# Patient Record
Sex: FEMALE | Race: WHITE | Employment: OTHER | URBAN - NONMETROPOLITAN AREA
[De-identification: names, ages, dates, MRNs, and addresses within clinical notes are randomized per-mention and may not be internally consistent; named-entity substitution may affect disease eponyms.]

---

## 2017-01-03 ENCOUNTER — TELEPHONE (OUTPATIENT)
Dept: GASTROENTEROLOGY | Age: 62
End: 2017-01-03

## 2017-01-05 ENCOUNTER — HOSPITAL ENCOUNTER (OUTPATIENT)
Dept: WOMENS IMAGING | Age: 62
Discharge: HOME | End: 2017-01-05
Payer: COMMERCIAL

## 2017-01-05 DIAGNOSIS — Z12.31 ENCOUNTER FOR SCREENING MAMMOGRAM FOR MALIGNANT NEOPLASM OF BREAST: ICD-10-CM

## 2017-01-05 PROCEDURE — 77063 BREAST TOMOSYNTHESIS BI: CPT

## 2017-01-19 ENCOUNTER — OFFICE VISIT (OUTPATIENT)
Dept: OTOLARYNGOLOGY | Age: 62
End: 2017-01-19
Payer: COMMERCIAL

## 2017-01-19 VITALS
WEIGHT: 141.8 LBS | HEIGHT: 63 IN | DIASTOLIC BLOOD PRESSURE: 88 MMHG | OXYGEN SATURATION: 97 % | BODY MASS INDEX: 25.12 KG/M2 | SYSTOLIC BLOOD PRESSURE: 138 MMHG | HEART RATE: 80 BPM

## 2017-01-19 DIAGNOSIS — H91.93 DECREASED HEARING OF BOTH EARS: ICD-10-CM

## 2017-01-19 DIAGNOSIS — H93.13 TINNITUS OF BOTH EARS: Primary | ICD-10-CM

## 2017-01-19 DIAGNOSIS — H61.21 IMPACTED CERUMEN OF RIGHT EAR: ICD-10-CM

## 2017-01-19 PROCEDURE — 99202 OFFICE O/P NEW SF 15 MIN: CPT | Performed by: OTOLARYNGOLOGY

## 2017-01-19 PROCEDURE — 69210 REMOVE IMPACTED EAR WAX UNI: CPT | Performed by: OTOLARYNGOLOGY

## 2017-01-19 RX ORDER — DIAZEPAM 5 MG/1
5 TABLET ORAL EVERY 6 HOURS PRN
COMMUNITY
End: 2017-03-16 | Stop reason: CLARIF

## 2017-01-19 ASSESSMENT — ENCOUNTER SYMPTOMS
GASTROINTESTINAL NEGATIVE: 1
BACK PAIN: 1
EYES NEGATIVE: 1
ALLERGIC/IMMUNOLOGIC NEGATIVE: 1
RESPIRATORY NEGATIVE: 1

## 2017-01-25 ENCOUNTER — TELEPHONE (OUTPATIENT)
Dept: OTOLARYNGOLOGY | Age: 62
End: 2017-01-25

## 2017-02-10 ENCOUNTER — TELEPHONE (OUTPATIENT)
Dept: OTOLARYNGOLOGY | Age: 62
End: 2017-02-10

## 2017-02-13 ENCOUNTER — HOSPITAL ENCOUNTER (OUTPATIENT)
Dept: VASCULAR LAB | Age: 62
Discharge: HOME | End: 2017-02-13
Payer: COMMERCIAL

## 2017-02-13 PROCEDURE — 93971 EXTREMITY STUDY: CPT

## 2017-02-20 ENCOUNTER — TELEPHONE (OUTPATIENT)
Dept: VASCULAR SURGERY | Age: 62
End: 2017-02-20

## 2017-02-21 ENCOUNTER — TELEPHONE (OUTPATIENT)
Dept: OTOLARYNGOLOGY | Age: 62
End: 2017-02-21

## 2017-03-16 ENCOUNTER — OFFICE VISIT (OUTPATIENT)
Dept: VASCULAR SURGERY | Age: 62
End: 2017-03-16
Payer: COMMERCIAL

## 2017-03-16 DIAGNOSIS — M51.36 DDD (DEGENERATIVE DISC DISEASE), LUMBAR: ICD-10-CM

## 2017-03-16 DIAGNOSIS — C80.1 CANCER (HCC): ICD-10-CM

## 2017-03-16 DIAGNOSIS — D68.51 FACTOR V LEIDEN (HCC): ICD-10-CM

## 2017-03-16 DIAGNOSIS — G50.0 TRIGEMINAL NEURALGIA: ICD-10-CM

## 2017-03-16 PROCEDURE — 99213 OFFICE O/P EST LOW 20 MIN: CPT | Performed by: NURSE PRACTITIONER

## 2017-03-16 RX ORDER — MULTIVIT-MIN/IRON/FOLIC/HRB186 3.3 MG-25
TABLET ORAL
COMMUNITY
End: 2022-04-14

## 2017-03-27 ENCOUNTER — EMPLOYEE WELLNESS (OUTPATIENT)
Dept: OTHER | Age: 62
End: 2017-03-27

## 2017-03-27 ENCOUNTER — OFFICE VISIT (OUTPATIENT)
Dept: OTOLARYNGOLOGY | Age: 62
End: 2017-03-27
Payer: COMMERCIAL

## 2017-03-27 VITALS
SYSTOLIC BLOOD PRESSURE: 138 MMHG | OXYGEN SATURATION: 98 % | DIASTOLIC BLOOD PRESSURE: 70 MMHG | HEIGHT: 63 IN | BODY MASS INDEX: 24.8 KG/M2 | WEIGHT: 140 LBS | RESPIRATION RATE: 20 BRPM | HEART RATE: 86 BPM

## 2017-03-27 DIAGNOSIS — H93.13 TINNITUS, BILATERAL: Primary | ICD-10-CM

## 2017-03-27 LAB
CHOLESTEROL, TOTAL: 235 MG/DL (ref 160–199)
GLUCOSE BLD-MCNC: 102 MG/DL (ref 74–109)
HDLC SERPL-MCNC: 83 MG/DL (ref 65–121)
LDL CHOLESTEROL CALCULATED: 138 MG/DL
TRIGL SERPL-MCNC: 71 MG/DL (ref 150–199)

## 2017-03-27 PROCEDURE — 99214 OFFICE O/P EST MOD 30 MIN: CPT | Performed by: OTOLARYNGOLOGY

## 2017-12-14 LAB
INR BLD: 1.58 (ref 0.88–1.18)
PROTHROMBIN TIME: 18.9 SEC (ref 12–14.6)

## 2017-12-21 LAB
INR BLD: 1.56 (ref 0.88–1.18)
PROTHROMBIN TIME: 18.7 SEC (ref 12–14.6)

## 2017-12-28 LAB
INR BLD: 1.72 (ref 0.88–1.18)
PROTHROMBIN TIME: 20.2 SEC (ref 12–14.6)

## 2018-01-02 LAB
INR BLD: 2.72 (ref 0.88–1.18)
PROTHROMBIN TIME: 29.2 SEC (ref 12–14.6)

## 2018-01-10 ENCOUNTER — HOSPITAL ENCOUNTER (OUTPATIENT)
Dept: WOMENS IMAGING | Age: 63
Discharge: HOME | End: 2018-01-10
Payer: COMMERCIAL

## 2018-01-10 DIAGNOSIS — Z12.39 BREAST CANCER SCREENING: ICD-10-CM

## 2018-01-10 LAB
INR BLD: 2.13 (ref 0.88–1.18)
PROTHROMBIN TIME: 24 SEC (ref 12–14.6)

## 2018-01-10 PROCEDURE — 77063 BREAST TOMOSYNTHESIS BI: CPT

## 2018-01-17 LAB
INR BLD: 2.46 (ref 0.88–1.18)
PROTHROMBIN TIME: 26.9 SEC (ref 12–14.6)

## 2018-03-20 VITALS — WEIGHT: 139 LBS | BODY MASS INDEX: 24.62 KG/M2

## 2018-03-28 ENCOUNTER — EMPLOYEE WELLNESS (OUTPATIENT)
Dept: OTHER | Age: 63
End: 2018-03-28

## 2018-03-28 LAB
CHOLESTEROL, TOTAL: 233 MG/DL (ref 160–199)
GLUCOSE BLD-MCNC: 92 MG/DL (ref 74–109)
HDLC SERPL-MCNC: 83 MG/DL (ref 65–121)
LDL CHOLESTEROL CALCULATED: 134 MG/DL
TRIGL SERPL-MCNC: 82 MG/DL (ref 0–149)

## 2018-04-02 VITALS — BODY MASS INDEX: 24.62 KG/M2 | WEIGHT: 139 LBS

## 2018-05-24 LAB
INR BLD: 2.29 (ref 0.88–1.18)
PROTHROMBIN TIME: 25.3 SEC (ref 12–14.6)

## 2018-06-15 LAB
INR BLD: 2.16 (ref 0.88–1.18)
PROTHROMBIN TIME: 24.1 SEC (ref 12–14.6)

## 2018-07-05 LAB
ALBUMIN SERPL-MCNC: 4.3 G/DL (ref 3.5–5.2)
ALP BLD-CCNC: 53 U/L (ref 35–104)
ALT SERPL-CCNC: 11 U/L (ref 5–33)
ANION GAP SERPL CALCULATED.3IONS-SCNC: 14 MMOL/L (ref 7–19)
AST SERPL-CCNC: 18 U/L (ref 5–32)
BASOPHILS ABSOLUTE: 0 K/UL (ref 0–0.2)
BASOPHILS RELATIVE PERCENT: 0.7 % (ref 0–1)
BILIRUB SERPL-MCNC: 0.3 MG/DL (ref 0.2–1.2)
BUN BLDV-MCNC: 25 MG/DL (ref 8–23)
CALCIUM SERPL-MCNC: 9.1 MG/DL (ref 8.8–10.2)
CHLORIDE BLD-SCNC: 102 MMOL/L (ref 98–111)
CHOLESTEROL, TOTAL: 222 MG/DL (ref 160–199)
CO2: 25 MMOL/L (ref 22–29)
CREAT SERPL-MCNC: 0.7 MG/DL (ref 0.5–0.9)
EOSINOPHILS ABSOLUTE: 0.1 K/UL (ref 0–0.6)
EOSINOPHILS RELATIVE PERCENT: 1.5 % (ref 0–5)
GFR NON-AFRICAN AMERICAN: >60
GLUCOSE BLD-MCNC: 88 MG/DL (ref 74–109)
HCT VFR BLD CALC: 40.5 % (ref 37–47)
HDLC SERPL-MCNC: 75 MG/DL (ref 65–121)
HEMOGLOBIN: 12.6 G/DL (ref 12–16)
LDL CHOLESTEROL CALCULATED: 133 MG/DL
LYMPHOCYTES ABSOLUTE: 1.7 K/UL (ref 1.1–4.5)
LYMPHOCYTES RELATIVE PERCENT: 41 % (ref 20–40)
MCH RBC QN AUTO: 28.6 PG (ref 27–31)
MCHC RBC AUTO-ENTMCNC: 31.1 G/DL (ref 33–37)
MCV RBC AUTO: 92 FL (ref 81–99)
MONOCYTES ABSOLUTE: 0.4 K/UL (ref 0–0.9)
MONOCYTES RELATIVE PERCENT: 9.1 % (ref 0–10)
NEUTROPHILS ABSOLUTE: 1.9 K/UL (ref 1.5–7.5)
NEUTROPHILS RELATIVE PERCENT: 47.5 % (ref 50–65)
PDW BLD-RTO: 13.5 % (ref 11.5–14.5)
PLATELET # BLD: 222 K/UL (ref 130–400)
PMV BLD AUTO: 10.8 FL (ref 9.4–12.3)
POTASSIUM SERPL-SCNC: 4.5 MMOL/L (ref 3.5–5)
RBC # BLD: 4.4 M/UL (ref 4.2–5.4)
SODIUM BLD-SCNC: 141 MMOL/L (ref 136–145)
T3 FREE: 2.8 PG/ML (ref 2–4.4)
T4 FREE: 1.3 NG/DL (ref 0.9–1.7)
TOTAL PROTEIN: 7 G/DL (ref 6.6–8.7)
TRIGL SERPL-MCNC: 69 MG/DL (ref 0–149)
TSH SERPL DL<=0.05 MIU/L-ACNC: 1.67 UIU/ML (ref 0.27–4.2)
URIC ACID, SERUM: 4.3 MG/DL (ref 2.4–5.7)
WBC # BLD: 4.1 K/UL (ref 4.8–10.8)

## 2018-07-19 LAB
INR BLD: 2.34 (ref 0.88–1.18)
PROTHROMBIN TIME: 25.7 SEC (ref 12–14.6)

## 2018-09-06 LAB
INR BLD: 2.34 (ref 0.88–1.18)
PROTHROMBIN TIME: 25.7 SEC (ref 12–14.6)

## 2018-10-09 LAB
INR BLD: 2.91 (ref 0.88–1.18)
PROTHROMBIN TIME: 30.6 SEC (ref 12–14.6)

## 2018-11-19 LAB
INR BLD: 2.58 (ref 0.88–1.18)
PROTHROMBIN TIME: 27.8 SEC (ref 12–14.6)

## 2018-12-18 LAB
INR BLD: 2.47 (ref 0.88–1.18)
PROTHROMBIN TIME: 26 SEC (ref 12–14.6)

## 2019-01-23 LAB
INR BLD: 2.43 (ref 0.88–1.18)
PROTHROMBIN TIME: 25.6 SEC (ref 12–14.6)

## 2019-02-18 LAB
INR BLD: 2.66 (ref 0.88–1.18)
PROTHROMBIN TIME: 27.6 SEC (ref 12–14.6)

## 2019-03-12 ENCOUNTER — HOSPITAL ENCOUNTER (OUTPATIENT)
Dept: WOMENS IMAGING | Age: 64
Discharge: HOME | End: 2019-03-12
Payer: COMMERCIAL

## 2019-03-12 DIAGNOSIS — Z78.0 ASYMPTOMATIC AGE-RELATED POSTMENOPAUSAL STATE: ICD-10-CM

## 2019-03-12 DIAGNOSIS — Z12.31 ENCOUNTER FOR SCREENING MAMMOGRAM FOR MALIGNANT NEOPLASM OF BREAST: ICD-10-CM

## 2019-03-12 PROCEDURE — 77080 DXA BONE DENSITY AXIAL: CPT

## 2019-03-12 PROCEDURE — 77067 SCR MAMMO BI INCL CAD: CPT

## 2019-03-22 LAB
INR BLD: 2.12 (ref 0.88–1.18)
PROTHROMBIN TIME: 23 SEC (ref 12–14.6)

## 2019-04-15 LAB
INR BLD: 2.17 (ref 0.88–1.18)
PROTHROMBIN TIME: 23.4 SEC (ref 12–14.6)

## 2019-05-20 LAB
INR BLD: 2.83 (ref 0.88–1.18)
PROTHROMBIN TIME: 29 SEC (ref 12–14.6)

## 2019-06-26 LAB
INR BLD: 1.67 (ref 0.88–1.18)
PROTHROMBIN TIME: 19 SEC (ref 12–14.6)

## 2019-07-05 LAB
ALBUMIN SERPL-MCNC: 4.2 G/DL (ref 3.5–5.2)
ALP BLD-CCNC: 64 U/L (ref 35–104)
ALT SERPL-CCNC: 13 U/L (ref 5–33)
ANION GAP SERPL CALCULATED.3IONS-SCNC: 13 MMOL/L (ref 7–19)
AST SERPL-CCNC: 17 U/L (ref 5–32)
BASOPHILS ABSOLUTE: 0 K/UL (ref 0–0.2)
BASOPHILS RELATIVE PERCENT: 0.7 % (ref 0–1)
BILIRUB SERPL-MCNC: 0.3 MG/DL (ref 0.2–1.2)
BUN BLDV-MCNC: 19 MG/DL (ref 8–23)
CALCIUM SERPL-MCNC: 8.9 MG/DL (ref 8.8–10.2)
CHLORIDE BLD-SCNC: 104 MMOL/L (ref 98–111)
CHOLESTEROL, TOTAL: 203 MG/DL (ref 160–199)
CO2: 24 MMOL/L (ref 22–29)
CREAT SERPL-MCNC: 0.6 MG/DL (ref 0.5–0.9)
EOSINOPHILS ABSOLUTE: 0.1 K/UL (ref 0–0.6)
EOSINOPHILS RELATIVE PERCENT: 2.2 % (ref 0–5)
GFR NON-AFRICAN AMERICAN: >60
GLUCOSE BLD-MCNC: 95 MG/DL (ref 74–109)
HCT VFR BLD CALC: 39.2 % (ref 37–47)
HDLC SERPL-MCNC: 75 MG/DL (ref 65–121)
HEMOGLOBIN: 12.2 G/DL (ref 12–16)
INR BLD: 2.33 (ref 0.88–1.18)
LDL CHOLESTEROL CALCULATED: 118 MG/DL
LYMPHOCYTES ABSOLUTE: 1.5 K/UL (ref 1.1–4.5)
LYMPHOCYTES RELATIVE PERCENT: 37.5 % (ref 20–40)
MCH RBC QN AUTO: 28.4 PG (ref 27–31)
MCHC RBC AUTO-ENTMCNC: 31.1 G/DL (ref 33–37)
MCV RBC AUTO: 91.2 FL (ref 81–99)
MONOCYTES ABSOLUTE: 0.4 K/UL (ref 0–0.9)
MONOCYTES RELATIVE PERCENT: 9.4 % (ref 0–10)
NEUTROPHILS ABSOLUTE: 2 K/UL (ref 1.5–7.5)
NEUTROPHILS RELATIVE PERCENT: 50.2 % (ref 50–65)
PDW BLD-RTO: 13.7 % (ref 11.5–14.5)
PLATELET # BLD: 230 K/UL (ref 130–400)
PMV BLD AUTO: 10.5 FL (ref 9.4–12.3)
POTASSIUM SERPL-SCNC: 4.4 MMOL/L (ref 3.5–5)
PROTHROMBIN TIME: 24.8 SEC (ref 12–14.6)
RBC # BLD: 4.3 M/UL (ref 4.2–5.4)
SODIUM BLD-SCNC: 141 MMOL/L (ref 136–145)
T3 FREE: 3.1 PG/ML (ref 2–4.4)
T4 FREE: 1.3 NG/DL (ref 0.9–1.7)
TOTAL PROTEIN: 6.8 G/DL (ref 6.6–8.7)
TRIGL SERPL-MCNC: 52 MG/DL (ref 0–149)
TSH SERPL DL<=0.05 MIU/L-ACNC: 1.5 UIU/ML (ref 0.27–4.2)
URIC ACID, SERUM: 4.2 MG/DL (ref 2.4–5.7)
WBC # BLD: 4.1 K/UL (ref 4.8–10.8)

## 2019-08-07 LAB
INR BLD: 3.02 (ref 0.88–1.18)
PROTHROMBIN TIME: 30.5 SEC (ref 12–14.6)

## 2019-08-22 LAB
INR BLD: 2.28 (ref 0.88–1.18)
PROTHROMBIN TIME: 24.4 SEC (ref 12–14.6)

## 2019-10-21 LAB
INR BLD: 2.34 (ref 0.88–1.18)
PROTHROMBIN TIME: 24.9 SEC (ref 12–14.6)

## 2019-12-31 LAB
INR BLD: 2.63 (ref 0.88–1.18)
PROTHROMBIN TIME: 27.3 SEC (ref 12–14.6)

## 2020-02-06 LAB
INR BLD: 2.63 (ref 0.88–1.18)
PROTHROMBIN TIME: 27.3 SEC (ref 12–14.6)

## 2020-03-12 LAB
INR BLD: 2.18 (ref 0.88–1.18)
PROTHROMBIN TIME: 24.7 SEC (ref 12–14.6)

## 2020-03-16 ENCOUNTER — HOSPITAL ENCOUNTER (OUTPATIENT)
Dept: WOMENS IMAGING | Age: 65
Discharge: HOME | End: 2020-03-16
Payer: COMMERCIAL

## 2020-03-16 PROCEDURE — 77067 SCR MAMMO BI INCL CAD: CPT

## 2020-04-28 LAB
INR BLD: 2.45 (ref 0.88–1.18)
PROTHROMBIN TIME: 27.1 SEC (ref 12–14.6)

## 2020-06-17 LAB
INR BLD: 2.62 (ref 0.88–1.18)
PROTHROMBIN TIME: 28.7 SEC (ref 12–14.6)

## 2020-07-20 LAB
ALBUMIN SERPL-MCNC: 4.6 G/DL (ref 3.5–5.2)
ALP BLD-CCNC: 61 U/L (ref 35–104)
ALT SERPL-CCNC: 12 U/L (ref 5–33)
ANION GAP SERPL CALCULATED.3IONS-SCNC: 10 MMOL/L (ref 7–19)
AST SERPL-CCNC: 16 U/L (ref 5–32)
BASOPHILS ABSOLUTE: 0 K/UL (ref 0–0.2)
BASOPHILS RELATIVE PERCENT: 0.6 % (ref 0–1)
BILIRUB SERPL-MCNC: 0.4 MG/DL (ref 0.2–1.2)
BUN BLDV-MCNC: 25 MG/DL (ref 8–23)
CALCIUM SERPL-MCNC: 9.5 MG/DL (ref 8.8–10.2)
CHLORIDE BLD-SCNC: 102 MMOL/L (ref 98–111)
CHOLESTEROL, TOTAL: 213 MG/DL (ref 160–199)
CO2: 27 MMOL/L (ref 22–29)
CREAT SERPL-MCNC: 0.7 MG/DL (ref 0.5–0.9)
EOSINOPHILS ABSOLUTE: 0.1 K/UL (ref 0–0.6)
EOSINOPHILS RELATIVE PERCENT: 1.3 % (ref 0–5)
GFR AFRICAN AMERICAN: >59
GFR NON-AFRICAN AMERICAN: >60
GLUCOSE BLD-MCNC: 92 MG/DL (ref 74–109)
HCT VFR BLD CALC: 41.2 % (ref 37–47)
HDLC SERPL-MCNC: 78 MG/DL (ref 65–121)
HEMOGLOBIN: 13.1 G/DL (ref 12–16)
IMMATURE GRANULOCYTES #: 0 K/UL
INR BLD: 2.58 (ref 0.88–1.18)
LDL CHOLESTEROL CALCULATED: 122 MG/DL
LYMPHOCYTES ABSOLUTE: 1.5 K/UL (ref 1.1–4.5)
LYMPHOCYTES RELATIVE PERCENT: 32.7 % (ref 20–40)
MCH RBC QN AUTO: 28.9 PG (ref 27–31)
MCHC RBC AUTO-ENTMCNC: 31.8 G/DL (ref 33–37)
MCV RBC AUTO: 90.9 FL (ref 81–99)
MONOCYTES ABSOLUTE: 0.3 K/UL (ref 0–0.9)
MONOCYTES RELATIVE PERCENT: 6.2 % (ref 0–10)
NEUTROPHILS ABSOLUTE: 2.7 K/UL (ref 1.5–7.5)
NEUTROPHILS RELATIVE PERCENT: 59 % (ref 50–65)
PDW BLD-RTO: 14 % (ref 11.5–14.5)
PLATELET # BLD: 248 K/UL (ref 130–400)
PMV BLD AUTO: 10.4 FL (ref 9.4–12.3)
POTASSIUM SERPL-SCNC: 4.3 MMOL/L (ref 3.5–5)
PROTHROMBIN TIME: 28.3 SEC (ref 12–14.6)
RBC # BLD: 4.53 M/UL (ref 4.2–5.4)
SODIUM BLD-SCNC: 139 MMOL/L (ref 136–145)
T3 FREE: 2.7 PG/ML (ref 2–4.4)
T4 FREE: 1.24 NG/DL (ref 0.93–1.7)
TOTAL PROTEIN: 7 G/DL (ref 6.6–8.7)
TRIGL SERPL-MCNC: 65 MG/DL (ref 0–149)
TSH SERPL DL<=0.05 MIU/L-ACNC: 1.48 UIU/ML (ref 0.27–4.2)
URIC ACID, SERUM: 4.4 MG/DL (ref 2.4–5.7)
WBC # BLD: 4.7 K/UL (ref 4.8–10.8)

## 2020-08-05 LAB
INR BLD: 2.66 (ref 0.88–1.18)
PROTHROMBIN TIME: 29 SEC (ref 12–14.6)

## 2020-09-03 ENCOUNTER — EMPLOYEE WELLNESS (OUTPATIENT)
Dept: OTHER | Age: 65
End: 2020-09-03

## 2020-09-03 LAB
CHOLESTEROL, TOTAL: 212 MG/DL (ref 160–199)
GLUCOSE BLD-MCNC: 75 MG/DL (ref 74–109)
HDLC SERPL-MCNC: 72 MG/DL (ref 65–121)
LDL CHOLESTEROL CALCULATED: 125 MG/DL
TRIGL SERPL-MCNC: 76 MG/DL (ref 0–149)

## 2020-09-15 LAB
INR BLD: 2.3 (ref 0.88–1.18)
PROTHROMBIN TIME: 25.8 SEC (ref 12–14.6)

## 2020-10-14 LAB
INR BLD: 2.02 (ref 0.88–1.18)
PROTHROMBIN TIME: 23.2 SEC (ref 12–14.6)

## 2020-10-19 VITALS — WEIGHT: 144 LBS | BODY MASS INDEX: 25.51 KG/M2

## 2020-11-03 PROBLEM — K21.9 GERD (GASTROESOPHAGEAL REFLUX DISEASE): Status: RESOLVED | Noted: 2020-11-03 | Resolved: 2020-11-03

## 2020-11-09 LAB
INR BLD: 2.41 (ref 0.88–1.18)
PROTHROMBIN TIME: 26.7 SEC (ref 12–14.6)

## 2020-12-16 LAB
INR BLD: 1.98 (ref 0.88–1.18)
PROTHROMBIN TIME: 22.8 SEC (ref 12–14.6)

## 2020-12-23 LAB
INR BLD: 1.62 (ref 0.88–1.18)
PROTHROMBIN TIME: 19.4 SEC (ref 12–14.6)

## 2020-12-28 LAB
INR BLD: 2.15 (ref 0.88–1.18)
PROTHROMBIN TIME: 24.4 SEC (ref 12–14.6)

## 2021-01-04 LAB
INR BLD: 2.15 (ref 0.88–1.18)
PROTHROMBIN TIME: 24.4 SEC (ref 12–14.6)

## 2021-01-18 LAB
ALBUMIN SERPL-MCNC: 4.2 G/DL (ref 3.5–5.2)
ALP BLD-CCNC: 70 U/L (ref 35–104)
ALT SERPL-CCNC: 16 U/L (ref 5–33)
ANION GAP SERPL CALCULATED.3IONS-SCNC: 8 MMOL/L (ref 7–19)
AST SERPL-CCNC: 18 U/L (ref 5–32)
BILIRUB SERPL-MCNC: <0.2 MG/DL (ref 0.2–1.2)
BUN BLDV-MCNC: 21 MG/DL (ref 8–23)
CALCIUM SERPL-MCNC: 9.5 MG/DL (ref 8.8–10.2)
CHLORIDE BLD-SCNC: 104 MMOL/L (ref 98–111)
CHOLESTEROL, TOTAL: 209 MG/DL (ref 160–199)
CO2: 27 MMOL/L (ref 22–29)
CREAT SERPL-MCNC: 0.8 MG/DL (ref 0.5–0.9)
GFR AFRICAN AMERICAN: >59
GFR NON-AFRICAN AMERICAN: >60
GLUCOSE BLD-MCNC: 107 MG/DL (ref 74–109)
HDLC SERPL-MCNC: 79 MG/DL (ref 65–121)
INR BLD: 2.89 (ref 0.88–1.18)
LDL CHOLESTEROL CALCULATED: 117 MG/DL
POTASSIUM SERPL-SCNC: 4.4 MMOL/L (ref 3.5–5)
PROTHROMBIN TIME: 31 SEC (ref 12–14.6)
SODIUM BLD-SCNC: 139 MMOL/L (ref 136–145)
TOTAL PROTEIN: 7 G/DL (ref 6.6–8.7)
TRIGL SERPL-MCNC: 67 MG/DL (ref 0–149)
VITAMIN D 25-HYDROXY: 27.5 NG/ML

## 2021-03-01 LAB
INR BLD: 2.25 (ref 0.88–1.18)
PROTHROMBIN TIME: 25.3 SEC (ref 12–14.6)

## 2021-03-18 ENCOUNTER — HOSPITAL ENCOUNTER (OUTPATIENT)
Dept: WOMENS IMAGING | Age: 66
Discharge: HOME | End: 2021-03-18
Payer: COMMERCIAL

## 2021-03-18 DIAGNOSIS — Z12.31 ENCOUNTER FOR SCREENING MAMMOGRAM FOR MALIGNANT NEOPLASM OF BREAST: ICD-10-CM

## 2021-03-18 PROCEDURE — 77067 SCR MAMMO BI INCL CAD: CPT

## 2021-04-05 LAB
INR BLD: 2.29 (ref 0.88–1.18)
PROTHROMBIN TIME: 25.7 SEC (ref 12–14.6)

## 2021-05-03 LAB
INR BLD: 2.42 (ref 0.88–1.18)
PROTHROMBIN TIME: 26.9 SEC (ref 12–14.6)

## 2021-06-07 LAB
INR BLD: 2.13 (ref 0.88–1.18)
PROTHROMBIN TIME: 24.2 SEC (ref 12–14.6)

## 2021-06-17 LAB
CHOLESTEROL, TOTAL: 213 MG/DL (ref 160–199)
GLUCOSE BLD-MCNC: 87 MG/DL (ref 74–109)
HDLC SERPL-MCNC: 79 MG/DL (ref 65–121)
LDL CHOLESTEROL CALCULATED: 121 MG/DL
TRIGL SERPL-MCNC: 65 MG/DL (ref 0–149)

## 2021-07-20 LAB
INR BLD: 2.15 (ref 0.88–1.18)
PROTHROMBIN TIME: 23.8 SEC (ref 12–14.6)

## 2021-08-24 LAB
INR BLD: 2.82 (ref 0.88–1.18)
PROTHROMBIN TIME: 29.2 SEC (ref 12–14.6)

## 2021-09-20 LAB
INR BLD: 3.68 (ref 0.88–1.18)
PROTHROMBIN TIME: 35.7 SEC (ref 12–14.6)

## 2021-09-24 ENCOUNTER — HOSPITAL ENCOUNTER (OUTPATIENT)
Dept: CT IMAGING | Age: 66
Discharge: HOME | End: 2021-09-24
Payer: MEDICARE

## 2021-09-24 DIAGNOSIS — R51.9 INTRACTABLE HEADACHE, UNSPECIFIED CHRONICITY PATTERN, UNSPECIFIED HEADACHE TYPE: ICD-10-CM

## 2021-09-24 DIAGNOSIS — M54.2 CERVICALGIA: ICD-10-CM

## 2021-09-24 DIAGNOSIS — G50.1 ATYPICAL FACE PAIN: ICD-10-CM

## 2021-09-24 LAB
INR BLD: 1.24 (ref 0.88–1.18)
PROTHROMBIN TIME: 15.7 SEC (ref 12–14.6)

## 2021-09-24 PROCEDURE — 72125 CT NECK SPINE W/O DYE: CPT

## 2021-09-24 PROCEDURE — 70486 CT MAXILLOFACIAL W/O DYE: CPT

## 2021-09-24 PROCEDURE — 70450 CT HEAD/BRAIN W/O DYE: CPT

## 2021-09-30 LAB
INR BLD: 2.42 (ref 0.88–1.18)
PROTHROMBIN TIME: 25.9 SEC (ref 12–14.6)

## 2021-11-22 LAB
INR BLD: 3.21 (ref 0.88–1.18)
PROTHROMBIN TIME: 32 SEC (ref 12–14.6)

## 2021-12-20 ENCOUNTER — HOSPITAL ENCOUNTER (OUTPATIENT)
Dept: MRI IMAGING | Age: 66
Discharge: HOME | End: 2021-12-20
Payer: MEDICARE

## 2021-12-20 DIAGNOSIS — M89.8X0 OTHER SPECIFIED DISORDERS OF BONE, MULTIPLE SITES: ICD-10-CM

## 2021-12-20 LAB
INR BLD: 2.6 (ref 0.88–1.18)
PROTHROMBIN TIME: 27.4 SEC (ref 12–14.6)

## 2021-12-20 PROCEDURE — 73221 MRI JOINT UPR EXTREM W/O DYE: CPT

## 2021-12-28 ENCOUNTER — HOSPITAL ENCOUNTER (OUTPATIENT)
Dept: MRI IMAGING | Age: 66
Discharge: HOME | End: 2021-12-28
Payer: MEDICARE

## 2021-12-28 DIAGNOSIS — M89.8X1 PAIN OF LEFT SCAPULA: ICD-10-CM

## 2021-12-28 PROCEDURE — 73221 MRI JOINT UPR EXTREM W/O DYE: CPT

## 2022-01-17 LAB
INR BLD: 2.68 (ref 0.88–1.18)
PROTHROMBIN TIME: 28 SEC (ref 12–14.6)

## 2022-02-09 ENCOUNTER — HOSPITAL ENCOUNTER (OUTPATIENT)
Dept: PHYSICAL THERAPY | Age: 67
Setting detail: THERAPIES SERIES
Discharge: HOME | End: 2022-02-09
Payer: MEDICARE

## 2022-02-09 PROCEDURE — 97162 PT EVAL MOD COMPLEX 30 MIN: CPT

## 2022-02-09 ASSESSMENT — PAIN DESCRIPTION - LOCATION: LOCATION: SHOULDER

## 2022-02-09 ASSESSMENT — PAIN DESCRIPTION - ONSET: ONSET: ON-GOING

## 2022-02-09 ASSESSMENT — PAIN DESCRIPTION - DESCRIPTORS: DESCRIPTORS: ACHING;DULL;NAGGING

## 2022-02-09 ASSESSMENT — PAIN DESCRIPTION - PAIN TYPE: TYPE: CHRONIC PAIN

## 2022-02-09 ASSESSMENT — PAIN DESCRIPTION - ORIENTATION: ORIENTATION: LEFT

## 2022-02-09 ASSESSMENT — PAIN DESCRIPTION - FREQUENCY: FREQUENCY: CONTINUOUS

## 2022-02-09 ASSESSMENT — PAIN SCALES - GENERAL: PAINLEVEL_OUTOF10: 3

## 2022-02-09 NOTE — PROGRESS NOTES
Physical Therapy  Initial Assessment  Date: 2022  Patient Name: Lilliana Gupta  MRN: [id number]  : 1955     Treatment Diagnosis: pain in left scapula (M25.512)    Restrictions       Subjective   General  Chart Reviewed: Yes  Patient assessed for rehabilitation services?: Yes  Additional Pertinent Hx: 74year old female referred to PT with diagnosis of pain in left scapula. 21 MRI was summarized as negative MRI of the left scapula and periscapular soft tissues. Response To Previous Treatment: Not applicable  Referring Practitioner: Cindi Slater MD  Referral Date : 22  Diagnosis: pain in left scapula (M25.512)  PT Visit Information  PT Insurance Information: Medicare, [facility]  Total # of Visits Approved: 12  Total # of Visits to Date: 1  Progress Note Due Date: 22  Subjective  Subjective: Patient states she has had left scapular pain for \"years\", most recently aggravated after trying to lift a large rug. She has had a steroid injections in left scapular area, last one in December of last year. She had retired last year, and her pain has been better. Her pain is partly anterior, over the coracoid process in mornings and later around spine of left scapula and rhomboids. She is able to dress and perform ADL's without much difficulty. She typically wears her purse over her left shoulder. Pain Screening  Patient Currently in Pain: Yes  Pain Assessment  Pain Assessment: 0-10  Pain Level: 3  Pain Type: Chronic pain  Pain Location: Shoulder  Pain Orientation: Left  Pain Radiating Towards: No radicular pain reported.   Pain Descriptors: Aching;Dull;Nagging  Pain Frequency: Continuous  Pain Onset: On-going  Vital Signs  Patient Currently in Pain: Yes    Vision/Hearing  Vision  Vision: Within Functional Limits  Hearing  Hearing: Within functional limits    Orientation  Orientation  Overall Orientation Status: Within Normal Limits    Social/Functional History  Social/Functional History  Lives With: Spouse  Home Layout: One level  ADL Assistance: Independent  Homemaking Assistance: Independent  Transfer Assistance: Independent  Active : Yes  Mode of Transportation: [facility]  Occupation: Retired    Objective     Observation/Palpation  Posture: Good  Palpation: Increased tenderness to palpation of left upper traps, left rhomboids, left supraspinatus. Observation: Patient sits with anterior rounding of left shoulder, able to freely move her left arm during conservations. Stands with elevation of left shoulder, head sidebent to left, increased thoracic kyphosis, left pelvic obliquity with shift of trunk to right. PROM RUE (degrees)  RUE PROM: WNL  PROM LUE (degrees)  LUE PROM: WNL  LUE General PROM: Some pain reported with end range left shoulder flexion. Strength RUE  R Shoulder Flexion: 5/5  R Shoulder ABduction: 5/5  R Shoulder Internal Rotation: 5/5  R Shoulder External Rotation: 5/5  R Elbow Flexion: 5/5  R Elbow Extension: 5/5  Strength LUE  L Shoulder Flexion: 4+/5;4/5  L Shoulder ABduction: 4+/5;4/5  L Shoulder Internal Rotation: 4+/5;5/5  L Shoulder External Rotation: 4+/5;4/5  L Elbow Flexion: 4/5  L Elbow Extension: 5/5     Additional Measures  Special Tests: Negative for left shoulder impingement, negative RTC strain. Other: Patient scored 9.09% impairment on the general use portion of the QuickDASH. Sensation  Overall Sensation Status: WNL     Transfers  Sit to Stand: Independent  Stand to sit: Independent       Ambulation  Ambulation?: Yes  Ambulation 1  Surface: level tile  Device: No Device  Assistance: Independent  Quality of Gait: Elevated left shoulder, shift of trunk to right side, decreased stance time on right LE. Assessment   Conditions Requiring Skilled Therapeutic Intervention  Body structures, Functions, Activity limitations: Decreased ADL status; Decreased strength;Decreased high-level IADLs; Increased pain;Decreased posture  Assessment: Problem list: 1) chronic left scapular pain, 2) decreased left shoulder and scapular stabilizer strength, 3) postural deviation (left elevted shoulder, curvature of trunk to right), 4) habitual wearing of purse on the affected shoulder, 5) need for instruction in HEP. Treatment Diagnosis: pain in left scapula (M25.512)  Prognosis: Fair;Good  Decision Making: Medium Complexity  REQUIRES PT FOLLOW UP: Yes  Activity Tolerance  Activity Tolerance: Patient Tolerated treatment well         Plan   Plan  Times per week: 2x per week  Plan weeks: 4-6 weeks  Current Treatment Recommendations: Strengthening,ROM,Home Exercise Program,Patient/Caregiver Education & Training,Modalities,Pain Management,Manual Therapy - Soft Tissue Mobilization    Goals  Short term goals  Time Frame for Short term goals: 3-4 weeks  Short term goal 1: Patient to be independent with HEP. Short term goal 2: Patient to report less left shoulder pain when first waking in morning. Short term goal 3: Patient to be routinely avoiding wearing purse on left shoulder. Long term goals  Time Frame for Long term goals : 4-6 weeks  Long term goal 1: Patient to report less left scapular pain when reaching overhead. Long term goal 2: Patient to have >=4+/5 for left shoulder flexors, ER's and elbow flexors. Long term goal 3: Patient to score <= 5% impairment on the general use portion of the QuickDASH.        Therapy Time   Individual Concurrent Group Co-treatment   Time In 3485         Time Out 0940         Minutes 44         Timed Code Treatment Minutes: 44 Minutes  Electronically signed by Vivi Chappell PT on 2/9/2022 at 11:30 AM

## 2022-02-13 ENCOUNTER — APPOINTMENT (OUTPATIENT)
Dept: GENERAL RADIOLOGY | Facility: HOSPITAL | Age: 67
End: 2022-02-13

## 2022-02-13 ENCOUNTER — APPOINTMENT (OUTPATIENT)
Dept: ULTRASOUND IMAGING | Facility: HOSPITAL | Age: 67
End: 2022-02-13

## 2022-02-13 ENCOUNTER — APPOINTMENT (OUTPATIENT)
Dept: CT IMAGING | Facility: HOSPITAL | Age: 67
End: 2022-02-13

## 2022-02-13 ENCOUNTER — HOSPITAL ENCOUNTER (EMERGENCY)
Facility: HOSPITAL | Age: 67
Discharge: HOME | End: 2022-02-13
Admitting: EMERGENCY MEDICINE

## 2022-02-13 VITALS
TEMPERATURE: 98.2 F | HEIGHT: 63 IN | WEIGHT: 150 LBS | SYSTOLIC BLOOD PRESSURE: 138 MMHG | HEART RATE: 85 BPM | OXYGEN SATURATION: 97 % | DIASTOLIC BLOOD PRESSURE: 69 MMHG | BODY MASS INDEX: 26.58 KG/M2 | RESPIRATION RATE: 18 BRPM

## 2022-02-13 DIAGNOSIS — I26.99 ACUTE PULMONARY EMBOLISM WITHOUT ACUTE COR PULMONALE, UNSPECIFIED PULMONARY EMBOLISM TYPE: Primary | ICD-10-CM

## 2022-02-13 DIAGNOSIS — I83.92 VARICOSE VEINS OF LEFT LOWER EXTREMITY, UNSPECIFIED WHETHER COMPLICATED: ICD-10-CM

## 2022-02-13 LAB
ALBUMIN SERPL-MCNC: 4.4 G/DL (ref 3.5–5.2)
ALBUMIN/GLOB SERPL: 1.8 G/DL
ALP SERPL-CCNC: 86 U/L (ref 39–117)
ALT SERPL W P-5'-P-CCNC: 14 U/L (ref 1–33)
ANION GAP SERPL CALCULATED.3IONS-SCNC: 10 MMOL/L (ref 5–15)
AST SERPL-CCNC: 17 U/L (ref 1–32)
BASOPHILS # BLD AUTO: 0.03 10*3/MM3 (ref 0–0.2)
BASOPHILS NFR BLD AUTO: 0.5 % (ref 0–1.5)
BILIRUB SERPL-MCNC: 0.3 MG/DL (ref 0–1.2)
BUN SERPL-MCNC: 18 MG/DL (ref 8–23)
BUN/CREAT SERPL: 32.1 (ref 7–25)
CALCIUM SPEC-SCNC: 9 MG/DL (ref 8.6–10.5)
CHLORIDE SERPL-SCNC: 106 MMOL/L (ref 98–107)
CO2 SERPL-SCNC: 26 MMOL/L (ref 22–29)
CREAT SERPL-MCNC: 0.56 MG/DL (ref 0.57–1)
DEPRECATED RDW RBC AUTO: 46 FL (ref 37–54)
EOSINOPHIL # BLD AUTO: 0.03 10*3/MM3 (ref 0–0.4)
EOSINOPHIL NFR BLD AUTO: 0.5 % (ref 0.3–6.2)
ERYTHROCYTE [DISTWIDTH] IN BLOOD BY AUTOMATED COUNT: 14 % (ref 12.3–15.4)
GFR SERPL CREATININE-BSD FRML MDRD: 108 ML/MIN/1.73
GLOBULIN UR ELPH-MCNC: 2.5 GM/DL
GLUCOSE SERPL-MCNC: 113 MG/DL (ref 65–99)
HCT VFR BLD AUTO: 38.4 % (ref 34–46.6)
HGB BLD-MCNC: 11.9 G/DL (ref 12–15.9)
IMM GRANULOCYTES # BLD AUTO: 0.02 10*3/MM3 (ref 0–0.05)
IMM GRANULOCYTES NFR BLD AUTO: 0.3 % (ref 0–0.5)
INR PPP: 1.73 (ref 0.91–1.09)
LYMPHOCYTES # BLD AUTO: 1.24 10*3/MM3 (ref 0.7–3.1)
LYMPHOCYTES NFR BLD AUTO: 18.8 % (ref 19.6–45.3)
MCH RBC QN AUTO: 27.9 PG (ref 26.6–33)
MCHC RBC AUTO-ENTMCNC: 31 G/DL (ref 31.5–35.7)
MCV RBC AUTO: 89.9 FL (ref 79–97)
MONOCYTES # BLD AUTO: 0.54 10*3/MM3 (ref 0.1–0.9)
MONOCYTES NFR BLD AUTO: 8.2 % (ref 5–12)
NEUTROPHILS NFR BLD AUTO: 4.74 10*3/MM3 (ref 1.7–7)
NEUTROPHILS NFR BLD AUTO: 71.7 % (ref 42.7–76)
NRBC BLD AUTO-RTO: 0 /100 WBC (ref 0–0.2)
NT-PROBNP SERPL-MCNC: 135.7 PG/ML (ref 0–900)
PLATELET # BLD AUTO: 207 10*3/MM3 (ref 140–450)
PMV BLD AUTO: 9.8 FL (ref 6–12)
POTASSIUM SERPL-SCNC: 3.7 MMOL/L (ref 3.5–5.2)
PROT SERPL-MCNC: 6.9 G/DL (ref 6–8.5)
PROTHROMBIN TIME: 19.6 SECONDS (ref 11.9–14.6)
RBC # BLD AUTO: 4.27 10*6/MM3 (ref 3.77–5.28)
SODIUM SERPL-SCNC: 142 MMOL/L (ref 136–145)
TROPONIN T SERPL-MCNC: <0.01 NG/ML (ref 0–0.03)
WBC NRBC COR # BLD: 6.6 10*3/MM3 (ref 3.4–10.8)

## 2022-02-13 PROCEDURE — 99283 EMERGENCY DEPT VISIT LOW MDM: CPT

## 2022-02-13 PROCEDURE — 93971 EXTREMITY STUDY: CPT | Performed by: SURGERY

## 2022-02-13 PROCEDURE — 93010 ELECTROCARDIOGRAM REPORT: CPT | Performed by: INTERNAL MEDICINE

## 2022-02-13 PROCEDURE — 96372 THER/PROPH/DIAG INJ SC/IM: CPT

## 2022-02-13 PROCEDURE — 80053 COMPREHEN METABOLIC PANEL: CPT | Performed by: NURSE PRACTITIONER

## 2022-02-13 PROCEDURE — 71275 CT ANGIOGRAPHY CHEST: CPT

## 2022-02-13 PROCEDURE — 85025 COMPLETE CBC W/AUTO DIFF WBC: CPT | Performed by: NURSE PRACTITIONER

## 2022-02-13 PROCEDURE — 84484 ASSAY OF TROPONIN QUANT: CPT | Performed by: NURSE PRACTITIONER

## 2022-02-13 PROCEDURE — 71045 X-RAY EXAM CHEST 1 VIEW: CPT

## 2022-02-13 PROCEDURE — 85610 PROTHROMBIN TIME: CPT | Performed by: NURSE PRACTITIONER

## 2022-02-13 PROCEDURE — 93971 EXTREMITY STUDY: CPT

## 2022-02-13 PROCEDURE — 83880 ASSAY OF NATRIURETIC PEPTIDE: CPT | Performed by: NURSE PRACTITIONER

## 2022-02-13 PROCEDURE — 0 IOPAMIDOL PER 1 ML: Performed by: NURSE PRACTITIONER

## 2022-02-13 PROCEDURE — 93005 ELECTROCARDIOGRAM TRACING: CPT | Performed by: NURSE PRACTITIONER

## 2022-02-13 PROCEDURE — 25010000002 ENOXAPARIN PER 10 MG: Performed by: NURSE PRACTITIONER

## 2022-02-13 RX ORDER — WARFARIN SODIUM 7.5 MG/1
7 TABLET ORAL
COMMUNITY

## 2022-02-13 RX ORDER — SODIUM CHLORIDE 0.9 % (FLUSH) 0.9 %
10 SYRINGE (ML) INJECTION AS NEEDED
Status: DISCONTINUED | OUTPATIENT
Start: 2022-02-13 | End: 2022-02-13 | Stop reason: HOSPADM

## 2022-02-13 RX ADMIN — IOPAMIDOL 100 ML: 755 INJECTION, SOLUTION INTRAVENOUS at 12:32

## 2022-02-13 RX ADMIN — ENOXAPARIN SODIUM 70 MG: 80 INJECTION SUBCUTANEOUS at 14:20

## 2022-02-13 NOTE — ED PROVIDER NOTES
Subjective   Patient is a 66-year-old white female presents emergency department with left lower extremity pain, swelling, redness that started 4 days ago.  Patient does have a history of DVT in the extremity.  She has a history of factor V deficiency.  She states recently she had to discontinue her Coumadin due to dental procedure and was off of the Coumadin for about 6 days.  She states she started the Coumadin back about a week ago.  She started back at 7.5 mg/day.  She states that she started having the pain in the leg about 4 days ago and then last night started having some shortness of breath with minimal exertion as well.  She denies any chest pain.  She denies any fever or chills.  No nausea or vomiting.  No other complaints.      History provided by:  Patient   used: No        Review of Systems   Constitutional:        Patient is a 66-year-old white female presents emergency department with left lower extremity pain, swelling, redness that started 4 days ago.  Patient does have a history of DVT in the extremity.  She has a history of factor V deficiency.  She states recently she had to discontinue her Coumadin due to dental procedure and was off of the Coumadin for about 6 days.  She states she started the Coumadin back about a week ago.  She started back at 7.5 mg/day.  She states that she started having the pain in the leg about 4 days ago and then last night started having some shortness of breath with minimal exertion as well.  She denies any chest pain.  She denies any fever or chills.  No nausea or vomiting.  No other complaints.     HENT: Negative.    Eyes: Negative.    Respiratory: Negative.    Cardiovascular: Negative.    Gastrointestinal: Negative.    Endocrine: Negative.    Genitourinary: Negative.    Musculoskeletal: Negative.    Skin: Negative.    Allergic/Immunologic: Negative.    Neurological: Negative.    Hematological: Negative.    Psychiatric/Behavioral: Negative.   "  All other systems reviewed and are negative.      Past Medical History:   Diagnosis Date   • Factor V deficiency (HCC)    • Hypertension    • Tinnitus        No Known Allergies    Past Surgical History:   Procedure Laterality Date   • CARPAL TUNNEL RELEASE Right    • KNEE SURGERY Right     total knee   • TOOTH EXTRACTION         History reviewed. No pertinent family history.    Social History     Socioeconomic History   • Marital status:    Tobacco Use   • Smoking status: Never Smoker   Substance and Sexual Activity   • Alcohol use: Never   • Drug use: Never       Prior to Admission medications    Medication Sig Start Date End Date Taking? Authorizing Provider   warfarin (COUMADIN) 7.5 MG tablet Take 7 mg by mouth Daily.   Yes Provider, MD Katt       /69   Pulse 85   Temp 98.2 °F (36.8 °C)   Resp 18   Ht 160 cm (63\")   Wt 68 kg (150 lb)   SpO2 97%   BMI 26.57 kg/m²     Objective   Physical Exam  Vitals and nursing note reviewed.   Constitutional:       Appearance: She is well-developed.      Comments: No acute distress.  Patient is nontoxic-appearing.  Respirations are even and unlabored.   HENT:      Head: Normocephalic and atraumatic.   Eyes:      Conjunctiva/sclera: Conjunctivae normal.      Pupils: Pupils are equal, round, and reactive to light.   Neck:      Thyroid: No thyromegaly.      Trachea: No tracheal deviation.   Cardiovascular:      Rate and Rhythm: Normal rate and regular rhythm.      Heart sounds: Normal heart sounds.   Pulmonary:      Effort: Pulmonary effort is normal. No respiratory distress.      Breath sounds: Normal breath sounds. No wheezing or rales.   Chest:      Chest wall: No tenderness.   Abdominal:      General: Bowel sounds are normal.      Palpations: Abdomen is soft.   Musculoskeletal:         General: Normal range of motion.      Cervical back: Normal range of motion and neck supple.      Left lower leg: Edema present.      Comments: Left lower extremity: " "There is moderate swelling noted to the lower left lower extremity.  There is erythema noted to the left calf.  She has multiple large varicose veins noted posteriorly.  Pedal pulses are palpable.   Skin:     General: Skin is warm and dry.   Neurological:      Mental Status: She is alert and oriented to person, place, and time.      Cranial Nerves: No cranial nerve deficit.      Deep Tendon Reflexes: Reflexes are normal and symmetric.   Psychiatric:         Behavior: Behavior normal.         Thought Content: Thought content normal.         Judgment: Judgment normal.         Procedures         Lab Results (last 24 hours)     ** No results found for the last 24 hours. **          CT Angiogram Chest   Final Result   1. Pulmonary embolus identified in the medial and lateral segments of   the right middle lobe. Central pulmonary arteries are nondilated. No   right heart strain.   2. There is a large calcified central disc protrusion at T6-T7 resulting   in a moderate to severe spinal stenosis.            This report was finalized on 02/13/2022 12:54 by Dr Sung Chao, .      XR Chest 1 View   Final Result   1. No radiographic evidence of acute cardiopulmonary process.           This report was finalized on 02/13/2022 12:37 by Dr Sung Chao, .      US Venous Doppler Lower Extremity Left (duplex)    (Results Pending)       ED Course  ED Course as of 02/14/22 1347   Sun Feb 13, 2022   1422 Vascular study - no dvt noted. Thrombus noted to varicose vein at mid medial calf. Cta chest - pulmonary embolus noted medial and lateral segment RML. No central pe noted. No heart strain. Pt has had no sob or chest pain since being in the er. She states that she had mild sob last night that lasted for \"just a second\" reviewed pt and pt care plan with dr chávez- also in agreement with care plan. 02 sat 100% on ra. Pt takes coumadin 7mg qd. Dr chávez advised to administer lovenox and increase coumadin dosage by 2mg today and tomorrow " and to follow up with dr nogueira tomorrow. Pt INR is 1.73. has only been back on coumadin 7mg for the past week. Pt is in agreement with care plan. She states that she has coumadin tablets at home that are 1mg. Advised the pt that she needs to return to the er if chest pain or sob, otherwise follow up with dr nogueira tomorrow.  [CW]      ED Course User Index  [CW] Abbi Cadena, SHERLY          MDM  Number of Diagnoses or Management Options  Acute pulmonary embolism without acute cor pulmonale, unspecified pulmonary embolism type (HCC): new and requires workup  Varicose veins of left lower extremity, unspecified whether complicated: minor     Amount and/or Complexity of Data Reviewed  Clinical lab tests: ordered and reviewed  Tests in the radiology section of CPT®: ordered and reviewed    Patient Progress  Patient progress: stable      Final diagnoses:   Acute pulmonary embolism without acute cor pulmonale, unspecified pulmonary embolism type (HCC)   Varicose veins of left lower extremity, unspecified whether complicated          Abbi Cadena, SHERLY  02/14/22 1413

## 2022-02-13 NOTE — DISCHARGE INSTRUCTIONS
Return to ER if symptoms worsen   Increase coumadin to 9mg once daily for 2 days   Follow up with dr nogueira tomorrow   Return to the er if shortness of breath or chest pain

## 2022-02-14 ENCOUNTER — HOSPITAL ENCOUNTER (OUTPATIENT)
Dept: PHYSICAL THERAPY | Age: 67
Setting detail: THERAPIES SERIES
End: 2022-02-14
Payer: MEDICARE

## 2022-02-14 LAB
QT INTERVAL: 382 MS
QTC INTERVAL: 440 MS

## 2022-02-17 ENCOUNTER — APPOINTMENT (OUTPATIENT)
Dept: PHYSICAL THERAPY | Age: 67
End: 2022-02-17
Payer: MEDICARE

## 2022-02-21 ENCOUNTER — TELEPHONE (OUTPATIENT)
Dept: VASCULAR SURGERY | Age: 67
End: 2022-02-21

## 2022-02-21 ENCOUNTER — HOSPITAL ENCOUNTER (OUTPATIENT)
Dept: PHYSICAL THERAPY | Age: 67
Setting detail: THERAPIES SERIES
Discharge: HOME | End: 2022-02-21
Payer: MEDICARE

## 2022-02-21 PROCEDURE — 97110 THERAPEUTIC EXERCISES: CPT

## 2022-02-21 NOTE — PROGRESS NOTES
Physical Therapy  Daily Treatment Note  Date: 2022  Patient Name: Carlos Manuel Garcia  MRN: [id number]     :   1955    Subjective:   General  Chart Reviewed: Yes  Additional Pertinent Hx: 74year old female referred to PT with diagnosis of pain in left scapula. 21 MRI was summarized as negative MRI of the left scapula and periscapular soft tissues. Response To Previous Treatment: Not applicable  Referring Practitioner: Tracy Tucker MD  PT Visit Information  PT Insurance Information: Medicare, [employer] [facility] [place]  Total # of Visits Approved: 12  Total # of Visits to Date: 2  Progress Note Due Date: 22  Subjective  Subjective: I was suppose to start last week but got a pulmonary embolisum. General Comment  Comments: CT scan showed a large calcified central disc protrusion at T6-T7 resulting in a moderate to severe spinal stenosis.   Pain Screening  Patient Currently in Pain: Denies  Vital Signs  Patient Currently in Pain: Denies       Treatment Activities:       Exercises  Exercise 1: overhead pulleys--3 min warmup  Exercise 2: finger walk with left hand--10  Exercise 3: left arm golfer's stretch--5 reps, 10 sec hold  Exercise 4: scapular retraction with green t-band--15 reps  Exercise 5: rhomboid stretch on post--5 reps, 10 second hold  Exercise 6: I's and T's (light resistance to start)  green t-band x 5  Exercise 7: lat pulls with Nautilus machine (small weight to start)  seated 20 pounds  x 10  Exercise 8: left arm forward bent row--2/10 reps with 3 pound weight  Exercise 9: alternating bicep curls with 3 pound weight x 10  Exercise 10: left shoulder ER/IR with green band--2/10 reps  Exercise 11: Paloff press  green t-band x 10  Exercise 12: lower trap arm lifts  x 10  Exercise 13: standing trunk sidebending to left  x 10  Exercise 14: standing trunk rotation to left  x 10  Exercise 15: L-stretch with right arm overhead, left out to side  x 5 with 5 second hold  Exercise 16: Youngblood's pose, right arm on bow and left pulling strings  x 5 with 5 second hold  Exercise 17: PNF diagonal using left arm (\"sword draw\")  x 5 with 5 second hold  Exercise 18: corner stretch--4 reps, 10 second hold  Exercise 19: left arm pect stretch--5 reps, 5 second hold  Exercise 20: supine lying on thoracic pad x 1 minute        Assessment:   Conditions Requiring Skilled Therapeutic Intervention  Body structures, Functions, Activity limitations: Decreased ADL status; Decreased strength;Decreased high-level IADLs; Increased pain;Decreased posture  Assessment: Initiated exericses per evaluating therapist.  She was able to perform all exericses on program today. She reported no pain with any exericses or stretch while performing. She states that she has some discomfort on the right side of spine between shoulder blades. Treatment Diagnosis: pain in left scapula (M25.512)  Prognosis: Fair;Good  REQUIRES PT FOLLOW UP: Yes  Discharge Recommendations: Continue to assess pending progress    Goals:  Short term goals  Time Frame for Short term goals: 3-4 weeks  Short term goal 1: Patient to be independent with HEP. Short term goal 2: Patient to report less left shoulder pain when first waking in morning. Short term goal 3: Patient to be routinely avoiding wearing purse on left shoulder. Long term goals  Time Frame for Long term goals : 4-6 weeks  Long term goal 1: Patient to report less left scapular pain when reaching overhead. Long term goal 2: Patient to have >=4+/5 for left shoulder flexors, ER's and elbow flexors. Long term goal 3: Patient to score <= 5% impairment on the general use portion of the QuickDASH.   Plan:    Plan  Times per week: 2x per week  Plan weeks: 4-6 weeks  Current Treatment Recommendations: Strengthening,ROM,Home Exercise Program,Patient/Caregiver Education & Training,Modalities,Pain Management,Manual Therapy - Soft Tissue Mobilization  Timed Code Treatment Minutes: 45 Minutes     Therapy Time Individual Concurrent Group Co-treatment   Time In 69 [phone] 60         Time Out 0842         Minutes 45         Timed Code Treatment Minutes: 45 Minutes  Electronically signed by Mary Lang PTA on 2/21/2022 at 8:50 AM

## 2022-02-22 RX ORDER — TOPIRAMATE 25 MG/1
25 TABLET ORAL 2 TIMES DAILY
COMMUNITY

## 2022-02-22 RX ORDER — PRAVASTATIN SODIUM 10 MG
10 TABLET ORAL DAILY
COMMUNITY

## 2022-02-22 RX ORDER — ALENDRONATE SODIUM 70 MG/1
70 TABLET ORAL
COMMUNITY

## 2022-02-23 ENCOUNTER — OFFICE VISIT (OUTPATIENT)
Dept: VASCULAR SURGERY | Age: 67
End: 2022-02-23
Payer: MEDICARE

## 2022-02-23 VITALS
DIASTOLIC BLOOD PRESSURE: 70 MMHG | BODY MASS INDEX: 25.51 KG/M2 | TEMPERATURE: 97.3 F | SYSTOLIC BLOOD PRESSURE: 136 MMHG | HEIGHT: 63 IN | OXYGEN SATURATION: 99 % | HEART RATE: 101 BPM

## 2022-02-23 DIAGNOSIS — I83.812 VARICOSE VEINS OF LEFT LOWER EXTREMITY WITH PAIN: Primary | ICD-10-CM

## 2022-02-23 PROCEDURE — G8417 CALC BMI ABV UP PARAM F/U: HCPCS | Performed by: PHYSICIAN ASSISTANT

## 2022-02-23 PROCEDURE — 1090F PRES/ABSN URINE INCON ASSESS: CPT | Performed by: PHYSICIAN ASSISTANT

## 2022-02-23 PROCEDURE — 3017F COLORECTAL CA SCREEN DOC REV: CPT | Performed by: PHYSICIAN ASSISTANT

## 2022-02-23 PROCEDURE — 99204 OFFICE O/P NEW MOD 45 MIN: CPT | Performed by: PHYSICIAN ASSISTANT

## 2022-02-23 PROCEDURE — G8484 FLU IMMUNIZE NO ADMIN: HCPCS | Performed by: PHYSICIAN ASSISTANT

## 2022-02-23 PROCEDURE — 4040F PNEUMOC VAC/ADMIN/RCVD: CPT | Performed by: PHYSICIAN ASSISTANT

## 2022-02-23 PROCEDURE — 4004F PT TOBACCO SCREEN RCVD TLK: CPT | Performed by: PHYSICIAN ASSISTANT

## 2022-02-23 PROCEDURE — G8399 PT W/DXA RESULTS DOCUMENT: HCPCS | Performed by: PHYSICIAN ASSISTANT

## 2022-02-23 PROCEDURE — G8427 DOCREV CUR MEDS BY ELIG CLIN: HCPCS | Performed by: PHYSICIAN ASSISTANT

## 2022-02-23 PROCEDURE — 1123F ACP DISCUSS/DSCN MKR DOCD: CPT | Performed by: PHYSICIAN ASSISTANT

## 2022-02-23 RX ORDER — HYDROCODONE BITARTRATE AND ACETAMINOPHEN 5; 325 MG/1; MG/1
1 TABLET ORAL EVERY 8 HOURS PRN
COMMUNITY

## 2022-02-23 NOTE — PROGRESS NOTES
 Cancer Father     Colon Cancer Paternal Grandmother     Colon Polyps Paternal Grandmother      Social History     Tobacco Use    Smoking status: Former Smoker     Packs/day: 1.50     Years: 10.00     Pack years: 15.00     Types: Cigarettes     Quit date: 1995     Years since quittin.1    Smokeless tobacco: Never Used    Tobacco comment: quit over 30 yrs   Substance Use Topics    Alcohol use: Yes     Comment: once or twice a year       Old records obtained from the referring provider. These records have been reviewed and summarized. Review of Systems    Constitutional  no significant activity change, appetite change, or unexpected weight change. No fever or chills. No diaphoresis or significant fatigue. HENT  no significant rhinorrhea or epistaxis. No tinnitus or significant hearing loss. Eyes  no sudden vision change or amaurosis. Respiratory  no significant shortness of breath, wheezing, or stridor. No apnea, cough, or chest tightness associated with shortness of breath. Cardiovascular  no chest pain, syncope, or significant dizziness. No palpitations. She reports leg swelling. No claudication. Gastrointestinal  no abdominal swelling or pain. No blood in stool. No severe constipation, diarrhea, nausea, or vomiting. Genitourinary  No difficulty urinating, dysuria, frequency, or urgency. No flank pain or hematuria. Musculoskeletal  no back pain, gait disturbance, or myalgia. Skin  no color change, rash, pallor, or new wound. Neurologic  no dizziness, facial asymmetry, or light headedness. No seizures. No speech difficulty or lateralizing weakness. Hematologic  no easy bruising or excessive bleeding. Psychiatric  no severe anxiety or nervousness. No confusion. All other review of systems are negative.       Physical Exam    /70 (Site: Left Upper Arm)   Pulse 101   Temp 97.3 °F (36.3 °C)   Ht 5' 3\" (1.6 m)   SpO2 99%   BMI 25.51 kg/m² Constitutional  well developed, well nourished. No diaphoresis or acute distress. HENT  head normocephalic. Right external ear canal appears normal.  Left external ear canal appears normal.  Septum appears midline. Eyes  conjunctiva normal.  EOMS normal.  No exudate. No icterus. Neck- ROM appears normal, no tracheal deviation. Cardiovascular  Regular rate and rhythm. Heart sounds are normal.  No murmur, rub, or gallop. Carotid pulses are 2+ to palpation bilaterally without bruit. Extremities - Radial and brachial pulses are 2+ to palpation bilaterally. DP and PT pulses are palpable. No cyanosis or clubbing. No signs atheroembolic event. She has multiple varicosities noted mainly on her left lower extremity with some swelling noted she has erythema swelling and induration noted mainly in the left medial calf and thigh area over the varicosities. Less varicosities noted right lower extremity. No wounds noted at this time. Pulmonary  effort appears normal.  No respiratory distress. Lungs - Breath sounds normal. No wheezes or rales. GI - Abdomen  soft, non tender, bowel sounds X 4 quadrants. No guarding or rebound tenderness. No distension or palpable mass. Genitourinary  deferred. Musculoskeletal  ROM appears normal.  Neurologic  alert and oriented X 3. Physiologic. Skin  warm, dry, and intact. No rash, erythema, or pallor. Psychiatric  mood, affect, and behavior appear normal.  Judgment and thought processes appear normal.      Assessment      1. Varicose veins of left lower extremity with pain          Plan      Support hose  20-30 mmHg compression daily.   I explained to her that she could wear thigh-high compression stockings if that was more comfortable in relationship to where her painful varicosities were located  Recommend leg elevation above the level of the heart  Continue Coumadin as directed by PCP  Recommend moist heat for symptomatic relief of pain in the      !      !      !6663               !   +----------------------------------------+------+------+-------------------+   ! GSV 2 cm Distal to Junction             !      !      !3830               !   +----------------------------------------+------+------+-------------------+   ! GSV Mid Thigh                           !      !      !3894               !   +----------------------------------------+------+------+-------------------+   ! GSV Knee                                !      !      !3419               !   +----------------------------------------+------+------+-------------------+   ! SSV High Calf                           !      !      !0                  !   +----------------------------------------+------+------+-------------------+   ! SSV Mid Calf                            !      !      !8274               !   +----------------------------------------+------+------+-------------------+   ! SSV Ankle                               !      !      !4989               !   +----------------------------------------+------+------+-------------------+       Left Doppler Measurements   +----------------------------------------+------+------+-------------------+   ! Location                                !Signal!Reflux! Reflux (msec)      !   +----------------------------------------+------+------+-------------------+   ! Sapheno Femoral Junction                !      !      !9229               !   +----------------------------------------+------+------+-------------------+   ! GSV 2 cm Distal to Junction             !      !      !3754               !   +----------------------------------------+------+------+-------------------+   ! GSV Mid Thigh                           !      !      !1845               !   +----------------------------------------+------+------+-------------------+   ! GSV Knee                                !      !      !1964               ! +----------------------------------------+------+------+-------------------+       Right Mapping   +----------------------------------------------+----------+----------------+   ! Location                                      !AP Diam   ! Trans Diam      !   +----------------------------------------------+----------+----------------+   ! Sapheno Femoral Junction                      !12.1      !                !   +----------------------------------------------+----------+----------------+   ! GSV 2 cm Distal to Junction                   !          !6.3             !   +----------------------------------------------+----------+----------------+   ! GSV Mid Thigh                                 !          !5. 1             !   +----------------------------------------------+----------+----------------+   ! GSV Knee                                      !          !4. 3             !   +----------------------------------------------+----------+----------------+   ! SSV High Calf                                 !          !1. 5             !   +----------------------------------------------+----------+----------------+   ! SSV Mid Calf                                  !          !3. 4             !   +----------------------------------------------+----------+----------------+   ! SSV Ankle                                     !          !2. 3             !   +----------------------------------------------+----------+----------------+       Left Mapping   +----------------------------------------------+----------+----------------+   ! Location                                      !AP Diam   ! Trans Diam      !   +----------------------------------------------+----------+----------------+   ! Sapheno Femoral Junction                      !11.4      !                !   +----------------------------------------------+----------+----------------+   ! GSV 2 cm Distal to Junction                   !          !15.8            !  !None      !   +------------------------------------+----------+---------------+----------+   ! Deep Femoral                        ! Yes       ! Yes            !None      !   +------------------------------------+----------+---------------+----------+   ! Popliteal                           ! Yes       ! Yes            !None      !   +------------------------------------+----------+---------------+----------+   ! SSV                                 ! Yes       ! Yes            !None      !   +------------------------------------+----------+---------------+----------+   ! Gastroc                             ! Yes       ! Yes            !None      !   +------------------------------------+----------+---------------+----------+   ! PTV                                 ! Yes       ! Yes            !None      !   +------------------------------------+----------+---------------+----------+   ! GSV                                 ! Yes       ! Yes            !None      !   +------------------------------------+----------+---------------+----------+   ! ATV                                 ! Yes       ! Yes            !None      !   +------------------------------------+----------+---------------+----------+   ! Peroneal                            ! Yes       ! Yes            !None      !   +------------------------------------+----------+---------------+----------+       Left Lower Extremities DVT Study Measurements   Left 2D Measurements   +------------------------------------+----------+---------------+----------+   ! Location                            ! Visualized! Compressibility! Thrombosis! +------------------------------------+----------+---------------+----------+   ! Sapheno Femoral Junction            ! Yes       ! Yes            !None      !   +------------------------------------+----------+---------------+----------+   ! Common Femoral                      ! Yes       ! Yes            !None      ! +------------------------------------+----------+---------------+----------+   ! Prox Femoral                        ! Yes       ! Yes            !None      !   +------------------------------------+----------+---------------+----------+   ! Mid Femoral                         ! Yes       ! Yes            !None      !   +------------------------------------+----------+---------------+----------+   ! Dist Femoral                        ! Yes       ! Yes            !None      !   +------------------------------------+----------+---------------+----------+   ! Deep Femoral                        ! Yes       ! Yes            !None      !   +------------------------------------+----------+---------------+----------+   ! Popliteal                           ! Yes       ! Yes            !None      !   +------------------------------------+----------+---------------+----------+   ! SSV                                 ! Yes       ! Yes            !None      !   +------------------------------------+----------+---------------+----------+   ! Gastroc                             ! Yes       ! Yes            !None      !   +------------------------------------+----------+---------------+----------+   ! PTV                                 ! Yes       ! Yes            !None      !   +------------------------------------+----------+---------------+----------+   ! GSV                                 ! Yes       !Partial        !          !   +------------------------------------+----------+---------------+----------+   ! ATV                                 ! Yes       ! Yes            !None      !   +------------------------------------+----------+---------------+----------+   ! Peroneal                            ! Yes       !Partial        ! Chronic   !   +------------------------------------+----------+---------------+----------+         Dr. Parul Minaya has reviewed the bilateral lower extremity venous scan for reflux.   He recommends proceeding with a radiofrequency ablation left greater saphenous vein since she has failed compression therapy and elevation  Options have been discussed with the patient including continued medical management versus proceeding with radiofrequency ablation left greater saphenous vein. Risks and benefits of the procedure were discussed with the patient including possibility of development of DVT, infection, nerve damage, bleeding, allergic reaction to anesthetic  She verbalizes understanding and wishes to proceed with procedure. We will submit for approval through her insurance company, if approved we will plan to proceed      Proceed with radiofrequency ablation left greater saphenous vein    Please note that parts of the chart were generated using Dragon dictation software. Although every effort was made to ensure the accuracy of this automated transcription, some errors in transcription may have occurred.

## 2022-02-24 ENCOUNTER — APPOINTMENT (OUTPATIENT)
Dept: PHYSICAL THERAPY | Age: 67
End: 2022-02-24
Payer: MEDICARE

## 2022-02-28 ENCOUNTER — HOSPITAL ENCOUNTER (OUTPATIENT)
Dept: PHYSICAL THERAPY | Age: 67
Setting detail: THERAPIES SERIES
Discharge: HOME | End: 2022-02-28
Payer: MEDICARE

## 2022-02-28 PROCEDURE — 97110 THERAPEUTIC EXERCISES: CPT

## 2022-02-28 ASSESSMENT — PAIN DESCRIPTION - ONSET: ONSET: AWAKENED FROM SLEEP

## 2022-02-28 ASSESSMENT — PAIN DESCRIPTION - PROGRESSION: CLINICAL_PROGRESSION: NOT CHANGED

## 2022-02-28 ASSESSMENT — PAIN DESCRIPTION - LOCATION: LOCATION: SHOULDER

## 2022-02-28 ASSESSMENT — PAIN DESCRIPTION - PAIN TYPE: TYPE: CHRONIC PAIN

## 2022-02-28 ASSESSMENT — PAIN DESCRIPTION - ORIENTATION: ORIENTATION: LEFT

## 2022-02-28 ASSESSMENT — PAIN DESCRIPTION - FREQUENCY: FREQUENCY: INTERMITTENT

## 2022-02-28 ASSESSMENT — PAIN DESCRIPTION - DESCRIPTORS: DESCRIPTORS: ACHING

## 2022-02-28 ASSESSMENT — PAIN SCALES - GENERAL: PAINLEVEL_OUTOF10: 5

## 2022-02-28 ASSESSMENT — PAIN - FUNCTIONAL ASSESSMENT: PAIN_FUNCTIONAL_ASSESSMENT: PREVENTS OR INTERFERES SOME ACTIVE ACTIVITIES AND ADLS

## 2022-02-28 NOTE — PROGRESS NOTES
Physical Therapy  Daily Treatment Note  Date: 2022  Patient Name: Landry Arredondo  MRN: [id number]     :   1955    Subjective:   General  Chart Reviewed: Yes  Additional Pertinent Hx: 74year old female referred to PT with diagnosis of pain in left scapula. 21 MRI was summarized as negative MRI of the left scapula and periscapular soft tissues. Response To Previous Treatment: Not applicable  Referring Practitioner: Lonnie Tate MD  PT Visit Information  PT Insurance Information: Medicare, [place]  Total # of Visits Approved: 12  Total # of Visits to Date: 3  Progress Note Due Date: 22  Subjective  Subjective: She relates her condition is about the same. General Comment  Comments: CT scan showed a large calcified central disc protrusion at T6-T7 resulting in a moderate to severe spinal stenosis.   Pain Screening  Patient Currently in Pain: Yes  Pain Assessment  Pain Assessment: 0-10  Pain Level: 5  Pain Type: Chronic pain  Pain Location: Shoulder  Pain Orientation: Left  Pain Descriptors: Aching  Pain Frequency: Intermittent  Pain Onset: Awakened from sleep  Clinical Progression: Not changed  Functional Pain Assessment: Prevents or interferes some active activities and ADLs  Vital Signs  Patient Currently in Pain: Yes       Treatment Activities:                                    Exercises  Exercise 1: overhead pulleys--4 min warmup  Exercise 2: finger walk with left hand--10  Exercise 3: left arm golfer's stretch--5 reps, 10 sec hold  Exercise 4: scapular retraction with green t-band--15 reps  Exercise 5: rhomboid stretch on post--5 reps, 10 second hold  NOT TODAT  Exercise 6: I's and T's (light resistance to start)  green t-band x 5  Exercise 7: lat pulls with Nautilus machine (small weight to start)  seated 20 pounds  x 10  Exercise 8: left arm forward bent row--2/10 reps with 3 pound weight  Exercise 9: alternating bicep curls with 3 pound weight x 10  Exercise 10: left shoulder ER/IR with green band--2/10 reps  Exercise 11: Paloff press  green t-band x 10  Exercise 12: lower trap arm lifts  x 10 NOT TODAY  Exercise 13: standing trunk sidebending to left  x 10  Exercise 14: standing trunk rotation to left  x 10  Exercise 15: L-stretch with right arm overhead, left out to side  x 5 with 5 second hold  Exercise 16: Youngblood's pose, right arm on bow and left pulling strings  x 5 with 5 second hold  Exercise 17: PNF diagonal using left arm (\"sword draw\")  x 5 with 5 second hold  Exercise 18: corner stretch--4 reps, 10 second hold  Exercise 19: left arm pect stretch--5 reps, 5 second hold  Exercise 20: supine lying on thoracic pad x 1 minute                               Assessment:   Conditions Requiring Skilled Therapeutic Intervention  Body structures, Functions, Activity limitations: Decreased ADL status; Decreased strength;Decreased high-level IADLs; Increased pain;Decreased posture  Assessment: She rates left shoulder girdle pain at 4-5/10 throughout physical therapy session. She has multiple left shoulder tender spot. Ther ex performed as per flow sheet. Treatment Diagnosis: pain in left scapula (M25.512)  Prognosis: Fair;Good  REQUIRES PT FOLLOW UP: Yes  Discharge Recommendations: Continue to assess pending progress    Goals:  Short term goals  Time Frame for Short term goals: 3-4 weeks  Short term goal 1: Patient to be independent with HEP. Short term goal 2: Patient to report less left shoulder pain when first waking in morning. Short term goal 3: Patient to be routinely avoiding wearing purse on left shoulder. Long term goals  Time Frame for Long term goals : 4-6 weeks  Long term goal 1: Patient to report less left scapular pain when reaching overhead. Long term goal 2: Patient to have >=4+/5 for left shoulder flexors, ER's and elbow flexors. Long term goal 3: Patient to score <= 5% impairment on the general use portion of the QuickDASH.   Plan:    Plan  Times per week: 2x per week  Plan weeks: 4-6 weeks  Current Treatment Recommendations: Strengthening,ROM,Home Exercise Program,Patient/Caregiver Education & Training,Modalities,Pain Management,Manual Therapy - Soft Tissue Mobilization  Timed Code Treatment Minutes: 72 Minutes     Therapy Time   Individual Concurrent Group Co-treatment   Time In 3084         Time Out 0900         Minutes 65         Timed Code Treatment Minutes: 65 Minutes  Electronically signed by Nora Ruano PT on 2/28/2022 at 12:12 PM

## 2022-03-01 ENCOUNTER — HOSPITAL ENCOUNTER (OUTPATIENT)
Dept: VASCULAR LAB | Age: 67
Discharge: HOME | End: 2022-03-01
Payer: MEDICARE

## 2022-03-01 DIAGNOSIS — I83.812 VARICOSE VEINS OF LEFT LOWER EXTREMITY WITH PAIN: ICD-10-CM

## 2022-03-01 PROCEDURE — 93970 EXTREMITY STUDY: CPT

## 2022-03-02 ENCOUNTER — HOSPITAL ENCOUNTER (OUTPATIENT)
Dept: PHYSICAL THERAPY | Age: 67
Setting detail: THERAPIES SERIES
Discharge: HOME | End: 2022-03-02
Payer: MEDICARE

## 2022-03-02 PROCEDURE — 97110 THERAPEUTIC EXERCISES: CPT

## 2022-03-02 ASSESSMENT — PAIN DESCRIPTION - LOCATION: LOCATION: SHOULDER

## 2022-03-02 ASSESSMENT — PAIN DESCRIPTION - ONSET: ONSET: AWAKENED FROM SLEEP

## 2022-03-02 ASSESSMENT — PAIN SCALES - GENERAL: PAINLEVEL_OUTOF10: 5

## 2022-03-02 ASSESSMENT — PAIN DESCRIPTION - PROGRESSION: CLINICAL_PROGRESSION: NOT CHANGED

## 2022-03-02 NOTE — PROGRESS NOTES
Physical Therapy  Daily Treatment Note  Date: 3/2/2022  Patient Name: Mariel Zazueta  MRN: [id number]     :   1955    Subjective:   General  Chart Reviewed: Yes  Additional Pertinent Hx: 74year old female referred to PT with diagnosis of pain in left scapula. 21 MRI was summarized as negative MRI of the left scapula and periscapular soft tissues. Response To Previous Treatment: Not applicable  Referring Practitioner: Fletcher Anguiano MD  PT Visit Information  PT Insurance Information: Medicare, [place] [employer] [place]  Total # of Visits Approved: 12  Total # of Visits to Date: 4  Progress Note Due Date: 22  Subjective  Subjective: She rates pain at 5/10. She has had some shoulder soreness. General Comment  Comments: CT scan showed a large calcified central disc protrusion at T6-T7 resulting in a moderate to severe spinal stenosis.   Pain Screening  Patient Currently in Pain: Yes  Pain Assessment  Pain Assessment: 0-10  Pain Level: 5  Pain Location: Shoulder  Pain Onset: Awakened from sleep  Clinical Progression: Not changed  Vital Signs  Patient Currently in Pain: Yes       Treatment Activities:                                    Exercises  Exercise 1: overhead pulleys--5 min warmup  Exercise 2: finger walk with left hand--10  Exercise 3: left arm golfer's stretch--5 reps, 10 sec hold  Exercise 4: scapular retraction with green t-band--15 reps  Exercise 5: rhomboid stretch on post--5 reps, 10 second hold  Exercise 6: I's and T's (light resistance to start)  red t-band x 5  Exercise 7: lat pulls with Nautilus machine (small weight to start)  seated 20 pounds  x 10  Exercise 8: left arm forward bent row--2/10 reps with 3 pound weight  Exercise 9: alternating bicep curls with 3 pound weight x 10  Exercise 10: left shoulder ER/IR with red band--2/10 reps  Exercise 11: Paloff press  green t-band x 10  Exercise 12: lower trap arm lifts  x 10  Exercise 13: standing trunk sidebending to left  x 10  Exercise 14: standing trunk rotation to left  x 10  Exercise 15: L-stretch with right arm overhead, left out to side  x 5 with 5 second hold  Exercise 16: Youngblood's pose, right arm on bow and left pulling strings  x 5 with 5 second hold  Exercise 17: PNF diagonal using left arm (\"sword draw\")  x 5 with 5 second hold  Exercise 18: corner stretch--4 reps, 10 second hold  Exercise 19: left arm pect stretch--5 reps, 5 second hold  Exercise 20: supine lying on thoracic pad x 1 minute                               Assessment:   Conditions Requiring Skilled Therapeutic Intervention  Body structures, Functions, Activity limitations: Decreased ADL status; Decreased strength;Decreased high-level IADLs; Increased pain;Decreased posture  Assessment: Pain at 5/10 at beginning of session, 4/10 afterward. Ther ex as per flow sheet. Treatment Diagnosis: pain in left scapula (M25.512)  Prognosis: Fair;Good  REQUIRES PT FOLLOW UP: Yes  Discharge Recommendations: Continue to assess pending progress    Goals:  Short term goals  Time Frame for Short term goals: 3-4 weeks  Short term goal 1: Patient to be independent with HEP. Short term goal 2: Patient to report less left shoulder pain when first waking in morning. Short term goal 3: Patient to be routinely avoiding wearing purse on left shoulder. Long term goals  Time Frame for Long term goals : 4-6 weeks  Long term goal 1: Patient to report less left scapular pain when reaching overhead. Long term goal 2: Patient to have >=4+/5 for left shoulder flexors, ER's and elbow flexors. Long term goal 3: Patient to score <= 5% impairment on the general use portion of the QuickDASH.   Plan:    Plan  Times per week: 2x per week  Plan weeks: 4-6 weeks  Current Treatment Recommendations: Strengthening,ROM,Home Exercise Program,Patient/Caregiver Education & Training,Modalities,Pain Management,Manual Therapy - Soft Tissue Mobilization  Timed Code Treatment Minutes: 60 Minutes     Therapy Time   Individual Concurrent Group Co-treatment   Time In 0845         Time Out 0945         Minutes 60         Timed Code Treatment Minutes: 60 Minutes  Electronically signed by Rodríguez Granado PT on 3/2/2022 at 9:56 AM

## 2022-03-07 ENCOUNTER — HOSPITAL ENCOUNTER (OUTPATIENT)
Dept: PHYSICAL THERAPY | Age: 67
Setting detail: THERAPIES SERIES
Discharge: HOME | End: 2022-03-07
Payer: MEDICARE

## 2022-03-07 PROCEDURE — 97110 THERAPEUTIC EXERCISES: CPT

## 2022-03-07 ASSESSMENT — PAIN DESCRIPTION - FREQUENCY: FREQUENCY: INTERMITTENT

## 2022-03-07 ASSESSMENT — PAIN DESCRIPTION - ORIENTATION: ORIENTATION: LEFT

## 2022-03-07 ASSESSMENT — PAIN DESCRIPTION - DESCRIPTORS: DESCRIPTORS: ACHING

## 2022-03-07 ASSESSMENT — PAIN DESCRIPTION - LOCATION: LOCATION: SHOULDER

## 2022-03-07 ASSESSMENT — PAIN DESCRIPTION - ONSET: ONSET: AWAKENED FROM SLEEP

## 2022-03-07 ASSESSMENT — PAIN DESCRIPTION - PROGRESSION: CLINICAL_PROGRESSION: GRADUALLY IMPROVING

## 2022-03-07 ASSESSMENT — PAIN SCALES - GENERAL: PAINLEVEL_OUTOF10: 2

## 2022-03-07 NOTE — PROGRESS NOTES
Physical Therapy  Daily Treatment Note/Reassessment  Date: 3/7/2022  Patient Name: Yamini Briseno  MRN: [id number]     :   1955    Subjective:   General  Chart Reviewed: Yes  Additional Pertinent Hx: 74year old female referred to PT with diagnosis of pain in left scapula. 21 MRI was summarized as negative MRI of the left scapula and periscapular soft tissues. Response To Previous Treatment: Not applicable  Referring Practitioner: Madison Eugene MD  PT Visit Information  PT Insurance Information: Medicare, [place] [employer] [place]  Total # of Visits Approved: 12  Total # of Visits to Date: 5  Progress Note Due Date: 22  Subjective  Subjective: She rates left shoulder pain at 2/10. She was able to work in the yard this weekend. .  General Comment  Comments: CT scan showed a large calcified central disc protrusion at T6-T7 resulting in a moderate to severe spinal stenosis. Pain Screening  Patient Currently in Pain: Yes  Pain Assessment  Pain Assessment: 0-10  Pain Level: 2  Pain Location: Shoulder  Pain Orientation: Left  Pain Descriptors: Aching  Pain Frequency: Intermittent  Pain Onset: Awakened from sleep  Clinical Progression: Gradually improving  Vital Signs  Patient Currently in Pain: Yes       Treatment Activities:                              PROM RUE (degrees)  RUE PROM: WNL  PROM LUE (degrees)  LUE PROM: WNL  LUE General PROM: Some pain reported with end range left shoulder flexion.   Strength RUE  R Shoulder Flexion: 5/5  R Shoulder ABduction: 5/5  R Shoulder Internal Rotation: 5/5  R Shoulder External Rotation: 5/5  R Elbow Flexion: 5/5  R Elbow Extension: 5/5  Strength LUE  L Shoulder Flexion: 5/5  L Shoulder ABduction: 5/5  L Shoulder Internal Rotation: 5/5  L Shoulder External Rotation: 4+/5;4/5  L Elbow Flexion: 5/5  L Elbow Extension: 5/5  Exercises  Exercise 1: overhead pulleys--5 min warmup  Exercise 2: finger walk with left hand--10  Exercise 3: left arm golfer's stretch--5 reps, 10 sec hold  Exercise 4: scapular retraction with green t-band--15 reps  Exercise 5: rhomboid stretch on post--5 reps, 10 second hold  Exercise 6: I's and T's (light resistance to start)  red t-band x 10  Exercise 7: lat pulls with Nautilus machine (small weight to start)  seated 20 pounds  x 10  Exercise 8: left arm forward bent row--2/10 reps with 3 pound weight  Exercise 9: alternating bicep curls with 3 pound weight x 10  Exercise 10: left shoulder ER/IR with red band--2/10 reps  Exercise 11: Paloff press  green t-band x 10  Exercise 12: lower trap arm lifts  x 10  Exercise 13: standing trunk sidebending to left  x 10  Not today  Exercise 14: standing trunk rotation to left  x 10  Not today  Exercise 15: L-stretch with right arm overhead, left out to side  x 5 with 5 second hold  Exercise 16: Youngblood's pose, right arm on bow and left pulling strings  x 5 with 5 second hold  Exercise 17: PNF diagonal using left arm (\"sword draw\")  x 5 with 5 second hold  Exercise 18: corner stretch--4 reps, 10 second hold  Exercise 19: left arm pect stretch--5 reps, 5 second hold  NOT TODAY  Exercise 20: supine lying on thoracic pad x 1 minute                          Left Hand Strength -  (lbs)  Handle Setting 2: 40, 45, 44  Right Hand Strength -  (lbs)  Handle Setting 2: 48, 45, 47    Assessment:   Conditions Requiring Skilled Therapeutic Intervention  Body structures, Functions, Activity limitations: Decreased ADL status; Decreased strength;Decreased high-level IADLs; Increased pain;Decreased posture  Assessment: She rates pain at 2/10. She performs ther ex as per flow sheet. Treatment Diagnosis: pain in left scapula (M25.512)  Prognosis: Fair;Good  REQUIRES PT FOLLOW UP: Yes  Discharge Recommendations: Continue to assess pending progress    Goals:  Short term goals  Time Frame for Short term goals: 3-4 weeks  Short term goal 1: Patient to be independent with HEP.    In progress  Short term goal 2: Patient to report less left shoulder pain when first waking in morning. In progress,  Pain is present but decreasing. Short term goal 3: Patient to be routinely avoiding wearing purse on left shoulder. In progress  Long term goals  Time Frame for Long term goals : 4-6 weeks  Long term goal 1: Patient to report less left scapular pain when reaching overhead. In progress  Long term goal 2: Patient to have >=4+/5 for left shoulder flexors, ER's and elbow flexors. In progress  Long term goal 3: Patient to score <= 5% impairment on the general use portion of the QuickDASH.    In progress 6.8 % impairment  Plan:    Plan  Times per week: 2x per week  Plan weeks: 4-6 weeks  Current Treatment Recommendations: Strengthening,ROM,Home Exercise Program,Patient/Caregiver Education & Training,Modalities,Pain Management,Manual Therapy - Soft Tissue Mobilization  Timed Code Treatment Minutes: 60 Minutes     Therapy Time   Individual Concurrent Group Co-treatment   Time In 4348         Time Out 1581         Minutes 60         Timed Code Treatment Minutes: 60 Minutes  Electronically signed by Laura Velasquez PT on 3/7/2022 at 4:29 PM

## 2022-03-09 ENCOUNTER — HOSPITAL ENCOUNTER (OUTPATIENT)
Dept: PHYSICAL THERAPY | Age: 67
Setting detail: THERAPIES SERIES
Discharge: HOME | End: 2022-03-09
Payer: MEDICARE

## 2022-03-09 PROCEDURE — 97110 THERAPEUTIC EXERCISES: CPT

## 2022-03-09 ASSESSMENT — PAIN DESCRIPTION - PROGRESSION: CLINICAL_PROGRESSION: GRADUALLY IMPROVING

## 2022-03-09 ASSESSMENT — PAIN DESCRIPTION - PAIN TYPE: TYPE: CHRONIC PAIN

## 2022-03-09 ASSESSMENT — PAIN SCALES - GENERAL: PAINLEVEL_OUTOF10: 3

## 2022-03-09 ASSESSMENT — PAIN DESCRIPTION - LOCATION: LOCATION: SHOULDER

## 2022-03-09 ASSESSMENT — PAIN DESCRIPTION - ORIENTATION: ORIENTATION: LEFT

## 2022-03-09 NOTE — PROGRESS NOTES
Physical Therapy  Daily Treatment Note  Date: 3/9/2022  Patient Name: Una Carpenter  MRN: [id number]     :   1955    Subjective:   General  Chart Reviewed: Yes  Additional Pertinent Hx: 74year old female referred to PT with diagnosis of pain in left scapula. 21 MRI was summarized as negative MRI of the left scapula and periscapular soft tissues. Response To Previous Treatment: Not applicable  Referring Practitioner: Luc Garcia MD  PT Visit Information  PT Insurance Information: Medicare, [place] [employer] [place]  Total # of Visits Approved: 12  Total # of Visits to Date: 6  Progress Note Due Date: 22  Subjective  Subjective: Patient reports that shoulder is doing \"pretty good\" having some tightness in left thoracic spine. Overall, left shoulder not as painful, sleeping a little better at night. General Comment  Comments: CT scan showed a large calcified central disc protrusion at T6-T7 resulting in a moderate to severe spinal stenosis.   Pain Screening  Patient Currently in Pain: Yes  Pain Assessment  Pain Assessment: 0-10  Pain Level: 3  Pain Type: Chronic pain  Pain Location: Shoulder  Pain Orientation: Left  Clinical Progression: Gradually improving  Vital Signs  Patient Currently in Pain: Yes       Treatment Activities:                                    Exercises  Exercise 1: overhead pulleys--5 min warmup  Exercise 2: finger walk with left hand--10  Exercise 3: left arm golfer's stretch--5 reps, 5 sec hold  Exercise 4: scapular retraction with green t-band--15 reps  Exercise 5: rhomboid stretch on post--5 reps, 10 second hold  Exercise 6: I's and T's (light resistance to start)  red t-band x 10  Exercise 7: lat pulls with Nautilus machine (small weight to start)  seated 20 pounds  x 10  Exercise 8: left arm forward bent row--2/10 reps with 3 pound weight  Exercise 9: alternating bicep curls with 3 pound weight x 10  Exercise 10: left shoulder ER/IR with red band--2/10 reps  Exercise 11: Paloff press  green t-band x 10  Exercise 12: lower trap arm lifts  x 10  Exercise 13: standing trunk sidebending to left  x 10  Not today  Exercise 14: standing trunk rotation to left  x 10  Not today  Exercise 15: L-stretch with right arm overhead, left out to side  x 5 with 5 second hold  Exercise 16: Youngblood's pose, right arm on bow and left pulling strings  x 5 with 5 second hold  Exercise 17: PNF diagonal using left arm (\"sword draw\")  x 5 with 5 second hold  Exercise 18: corner stretch--4 reps, 10 second hold  Exercise 19: left arm pect stretch--5 reps, 5 second hold  NOT TODAY  Exercise 20: supine lying on rolled towel x 2 minute                               Assessment:   Conditions Requiring Skilled Therapeutic Intervention  Body structures, Functions, Activity limitations: Decreased ADL status; Decreased strength;Decreased high-level IADLs; Increased pain;Decreased posture  Assessment: Ms. Jose Eduardo Mcclendon appears to be making progress with strengthening of left arm, though she today was complaining of central thoracic pain. She is otherwise doing well with her program and giving good efforts with her exercises. She was issued a written HEP today along with band. Home pulleys not ;yet properly used due to her not knowing the rope need shortening for her arm length. Will continue with current routine. Treatment Diagnosis: pain in left scapula (M25.512)  Prognosis: Fair;Good  REQUIRES PT FOLLOW UP: Yes  Discharge Recommendations: Continue to assess pending progress    Goals:  Short term goals  Time Frame for Short term goals: 3-4 weeks  Short term goal 1: Patient to be independent with HEP. In progress  Short term goal 2: Patient to report less left shoulder pain when first waking in morning. In progress,  Pain is present but decreasing. Short term goal 3: Patient to be routinely avoiding wearing purse on left shoulder.    In progress  Long term goals  Time Frame for Long term goals : 4-6 weeks  Long term goal 1: Patient to report less left scapular pain when reaching overhead. In progress  Long term goal 2: Patient to have >=4+/5 for left shoulder flexors, ER's and elbow flexors. In progress  Long term goal 3: Patient to score <= 5% impairment on the general use portion of the QuickDASH.    In progress 6.8 % impairment  Plan:    Plan  Times per week: 2x per week  Plan weeks: 4-6 weeks  Current Treatment Recommendations: Strengthening,ROM,Home Exercise Program,Patient/Caregiver Education & Training,Modalities,Pain Management,Manual Therapy - Soft Tissue Mobilization  Timed Code Treatment Minutes: 64 Minutes     Therapy Time   Individual Concurrent Group Co-treatment   Time In 0800         Time Out 0856         Minutes 56         Timed Code Treatment Minutes: 56 Minutes  Electronically signed by Indra Ferreira PT on 3/9/2022 at 11:09 AM

## 2022-03-11 ENCOUNTER — TELEPHONE (OUTPATIENT)
Dept: VASCULAR SURGERY | Age: 67
End: 2022-03-11

## 2022-03-11 NOTE — TELEPHONE ENCOUNTER
----- Message from Eugenia Lazaro sent at 3/11/2022 11:33 AM CST -----  Regarding: ablation      Please call the pt to let her know we have her scheduled for her ablation with Dr. Darvin Wolff on Mon 3/21/2022. The pt will need to arrive at [facility] at 0730. No special prep or medication holds for her. She will need a  home.  Please arrive to the [facility] drive-up covid testing site on Fri 3/18/22 between 9-11 am.

## 2022-03-14 ENCOUNTER — HOSPITAL ENCOUNTER (OUTPATIENT)
Dept: PHYSICAL THERAPY | Age: 67
Setting detail: THERAPIES SERIES
Discharge: HOME | End: 2022-03-14
Payer: MEDICARE

## 2022-03-14 ENCOUNTER — TELEPHONE (OUTPATIENT)
Dept: VASCULAR SURGERY | Age: 67
End: 2022-03-14

## 2022-03-14 PROCEDURE — 97110 THERAPEUTIC EXERCISES: CPT

## 2022-03-14 ASSESSMENT — PAIN DESCRIPTION - LOCATION: LOCATION: SHOULDER;BACK

## 2022-03-14 ASSESSMENT — PAIN DESCRIPTION - PAIN TYPE: TYPE: CHRONIC PAIN

## 2022-03-14 ASSESSMENT — PAIN DESCRIPTION - DESCRIPTORS: DESCRIPTORS: ACHING

## 2022-03-14 ASSESSMENT — PAIN SCALES - GENERAL: PAINLEVEL_OUTOF10: 3

## 2022-03-14 ASSESSMENT — PAIN DESCRIPTION - ORIENTATION: ORIENTATION: UPPER;RIGHT

## 2022-03-14 ASSESSMENT — PAIN DESCRIPTION - PROGRESSION: CLINICAL_PROGRESSION: GRADUALLY IMPROVING

## 2022-03-14 NOTE — PROGRESS NOTES
Daily Treatment Note  Date: 3/14/2022  Patient Name: Star Hammond  MRN: [id number]     :   1955    Subjective:   General  Chart Reviewed: Yes  Additional Pertinent Hx: 74year old female referred to PT with diagnosis of pain in left scapula. 21 MRI was summarized as negative MRI of the left scapula and periscapular soft tissues. Response To Previous Treatment: Not applicable  Referring Practitioner: Keke Coker MD  PT Visit Information  PT Insurance Information: Medicare, [place] [employer] [place]  Total # of Visits Approved: 12  Total # of Visits to Date: 7  Progress Note Due Date: 22  Subjective  Subjective: My shoulder isnt really hurting but the spot by my spine is.   Pain Screening  Patient Currently in Pain: Yes  Pain Assessment  Pain Assessment: 0-10  Pain Level: 3  Pain Type: Chronic pain  Pain Location: Shoulder;Back  Pain Orientation: Upper;Right  Pain Descriptors: Aching  Clinical Progression: Gradually improving  Vital Signs  Patient Currently in Pain: Yes       Treatment Activities:   Exercises  Exercise 1: overhead pulleys--5 min warmup  Exercise 2: finger walk with left hand--15  Exercise 3: left arm golfer's stretch--5 reps, 5 sec hold  Exercise 4: scapular retraction with green t-band--20 reps  Exercise 5: rhomboid stretch on post--5 reps, 10 second hold  Exercise 6: I's and T's (light resistance to start)  red t-band x 10  Exercise 7: lat pulls with Nautilus machine (small weight to start)  seated 20 pounds  x 15  Exercise 8: left arm forward bent row--2/10 reps with 3 pound weight  Exercise 9: alternating bicep curls with 3 pound weight x 10  Exercise 10: left shoulder ER/IR with red band--2/10 reps  Exercise 11: Paloff press  green t-band x 15  Exercise 12: lower trap arm lifts  x 10  Exercise 13: standing trunk sidebending to left  x 10  Exercise 14: standing trunk rotation to left  x 5 with 5 sec hold  Exercise 15: L-stretch with right arm overhead, left out to side  x 5 with 5 second hold  Exercise 16: Youngblood's pose, right arm on bow and left pulling strings  x 10 with 5 second hold  Exercise 17: PNF diagonal using left arm (\"sword draw\")  x 5 with 5 second hold  Exercise 18: corner stretch--4 reps, 10 second hold  Exercise 19: left arm pect stretch--5 reps, 5 second hold  Exercise 20: supine lying on rolled towel x 2 minute    Assessment:   Conditions Requiring Skilled Therapeutic Intervention  Body structures, Functions, Activity limitations: Decreased ADL status; Decreased strength;Decreased high-level IADLs; Increased pain;Decreased posture  Assessment: Patient presents with continued thoracic pain this date but is able to complete exercises having no c/o increased discomfort. Increased reps and added some exercises this date with patient stating that her symptoms had improved some with exercises. Verbal, visual, and tactile cues needed to enusre corrent form/technique for max benefit. Will continue with current POC and progress as able. Treatment Diagnosis: pain in left scapula (M25.512)  Prognosis: Fair;Good  REQUIRES PT FOLLOW UP: Yes  Discharge Recommendations: Continue to assess pending progress      Goals:  Short term goals  Time Frame for Short term goals: 3-4 weeks  Short term goal 1: Patient to be independent with HEP. In progress  Short term goal 2: Patient to report less left shoulder pain when first waking in morning. In progress,  Pain is present but decreasing. Short term goal 3: Patient to be routinely avoiding wearing purse on left shoulder. In progress  Long term goals  Time Frame for Long term goals : 4-6 weeks  Long term goal 1: Patient to report less left scapular pain when reaching overhead. In progress  Long term goal 2: Patient to have >=4+/5 for left shoulder flexors, ER's and elbow flexors. In progress  Long term goal 3: Patient to score <= 5% impairment on the general use portion of the QuickDASH.    In progress 6.8 % impairment    Plan: Plan  Times per week: 2x per week  Plan weeks: 4-6 weeks  Current Treatment Recommendations: Strengthening,ROM,Home Exercise Program,Patient/Caregiver Education & Training,Modalities,Pain Management,Manual Therapy - Soft Tissue Mobilization  Timed Code Treatment Minutes: 62 Minutes     Therapy Time   Individual Concurrent Group Co-treatment   Time In 0800         Time Out 8895         [id number]         Timed Code Treatment Minutes: 62 Minutes       Electronically signed by William Muniz PTA on 3/14/2022 at 11:24 AM

## 2022-03-16 ENCOUNTER — HOSPITAL ENCOUNTER (OUTPATIENT)
Dept: PHYSICAL THERAPY | Age: 67
Setting detail: THERAPIES SERIES
Discharge: HOME | End: 2022-03-16
Payer: MEDICARE

## 2022-03-16 PROCEDURE — 97110 THERAPEUTIC EXERCISES: CPT

## 2022-03-16 ASSESSMENT — PAIN SCALES - GENERAL: PAINLEVEL_OUTOF10: 4

## 2022-03-16 ASSESSMENT — PAIN DESCRIPTION - ORIENTATION: ORIENTATION: UPPER;LEFT

## 2022-03-16 ASSESSMENT — PAIN DESCRIPTION - PROGRESSION: CLINICAL_PROGRESSION: GRADUALLY IMPROVING

## 2022-03-16 ASSESSMENT — PAIN DESCRIPTION - LOCATION: LOCATION: SHOULDER;BACK

## 2022-03-16 ASSESSMENT — PAIN DESCRIPTION - PAIN TYPE: TYPE: CHRONIC PAIN

## 2022-03-16 ASSESSMENT — PAIN DESCRIPTION - DESCRIPTORS: DESCRIPTORS: ACHING

## 2022-03-16 NOTE — PROGRESS NOTES
Daily Treatment Note  Date: 3/16/2022  Patient Name: Mariel Zazueta  MRN: [id number]     :   1955    Subjective:   General  Chart Reviewed: Yes  Additional Pertinent Hx: 74year old female referred to PT with diagnosis of pain in left scapula. 21 MRI was summarized as negative MRI of the left scapula and periscapular soft tissues. Response To Previous Treatment: Not applicable  Referring Practitioner: Fletcher Anguiano MD  PT Visit Information  PT Insurance Information: Medicare, [place] [employer] [place]  Total # of Visits Approved: 12  Total # of Visits to Date: 8  Progress Note Due Date: 22  Subjective  Subjective: Patient reports that her pain seems to move around a lot and be worse on some days compared to others. She points to her L shoulder blade area that is close to her armpit today but reports its not bad.   Pain Screening  Patient Currently in Pain: Yes  Pain Assessment  Pain Assessment: 0-10  Pain Level: 4  Pain Type: Chronic pain  Pain Location: Shoulder;Back  Pain Orientation: Upper;Left  Pain Descriptors: Aching  Clinical Progression: Gradually improving  Vital Signs  Patient Currently in Pain: Yes       Treatment Activities:   Exercises  Exercise 1: overhead pulleys--5 min warmup  Exercise 2: finger walk with left hand--15  Exercise 3: left arm golfer's stretch--5 reps, 5 sec hold  Exercise 4: scapular retraction with green t-band--20 reps  Exercise 5: rhomboid stretch on post--5 reps, 10 second hold  Exercise 6: I's and T's (light resistance to start)  red t-band x 20  Exercise 7: lat pulls with Nautilus machine (small weight to start)  seated 20 pounds  x 20  Exercise 8: left arm forward bent row--20 reps with 3 pound weight  Exercise 9: alternating bicep curls with 3 pound weight x 15  Exercise 10: left shoulder ER/IR with red band--20 reps  Exercise 11: Paloff press  green t-band x 20  Exercise 12: lower trap arm lifts  x 15  Exercise 13: standing trunk sidebending to left  x 10  Exercise 14: standing trunk rotation to left  x 10 with 5 sec hold  Exercise 15: L-stretch with right arm overhead, left out to side  x 5 with 5 second hold  Exercise 16: Youngblood's pose, right arm on bow and left pulling strings  x 10 with 5 second hold  Exercise 17: PNF diagonal using left arm (\"sword draw\")  x 10 with 5 second hold  Exercise 18: corner stretch--5 reps, 10 second hold  Exercise 19: left arm pect stretch--5 reps, 5 second hold  Exercise 20: supine lying on rolled towel x 2 minute     Assessment:   Conditions Requiring Skilled Therapeutic Intervention  Body structures, Functions, Activity limitations: Decreased ADL status; Decreased strength;Decreased high-level IADLs; Increased pain;Decreased posture  Assessment: Patient tolerates increased reps and resistance this date well having no c/o increased discomfort. She did have some c/o increased cramping in her mid back with lateral trunk flexion but was able to complete reps listed. Decreased pain rating of a 2/10 post session this date. Will assess patient's response to today's treatment and adjust exercises as needed at her next visit. Treatment Diagnosis: pain in left scapula (M25.512)  Prognosis: Fair;Good  REQUIRES PT FOLLOW UP: Yes  Discharge Recommendations: Continue to assess pending progress      Goals:  Short term goals  Time Frame for Short term goals: 3-4 weeks  Short term goal 1: Patient to be independent with HEP. In progress  Short term goal 2: Patient to report less left shoulder pain when first waking in morning. In progress,  Pain is present but decreasing. Short term goal 3: Patient to be routinely avoiding wearing purse on left shoulder. In progress  Long term goals  Time Frame for Long term goals : 4-6 weeks  Long term goal 1: Patient to report less left scapular pain when reaching overhead. In progress  Long term goal 2: Patient to have >=4+/5 for left shoulder flexors, ER's and elbow flexors.    In progress  Long term goal 3: Patient to score <= 5% impairment on the general use portion of the QuickDASH.    In progress 6.8 % impairment    Plan:    Plan  Times per week: 2x per week  Plan weeks: 4-6 weeks  Current Treatment Recommendations: Strengthening,ROM,Home Exercise Program,Patient/Caregiver Education & Training,Modalities,Pain Management,Manual Therapy - Soft Tissue Mobilization  Timed Code Treatment Minutes: 47 Minutes     Therapy Time   Individual Concurrent Group Co-treatment   Time In 0800         Time Out 5388         Minutes 54         Timed Code Treatment Minutes: 47 Minutes       Electronically signed by Lc Fitzpatrick PTA on 3/16/2022 at 11:29 AM

## 2022-03-21 ENCOUNTER — HOSPITAL ENCOUNTER (OUTPATIENT)
Dept: PHYSICAL THERAPY | Age: 67
Setting detail: THERAPIES SERIES
Discharge: HOME | End: 2022-03-21
Payer: MEDICARE

## 2022-03-21 PROCEDURE — 97110 THERAPEUTIC EXERCISES: CPT

## 2022-03-21 ASSESSMENT — PAIN DESCRIPTION - LOCATION: LOCATION: SHOULDER

## 2022-03-21 ASSESSMENT — PAIN DESCRIPTION - ORIENTATION: ORIENTATION: LEFT

## 2022-03-21 ASSESSMENT — PAIN SCALES - GENERAL: PAINLEVEL_OUTOF10: 4

## 2022-03-21 ASSESSMENT — PAIN DESCRIPTION - PAIN TYPE: TYPE: CHRONIC PAIN

## 2022-03-21 ASSESSMENT — PAIN DESCRIPTION - PROGRESSION: CLINICAL_PROGRESSION: GRADUALLY IMPROVING

## 2022-03-21 NOTE — PROGRESS NOTES
Physical Therapy  Daily Treatment Note  Date: 3/21/2022  Patient Name: Rivka Amaro  MRN: [id number]     :   1955    Subjective:   General  Chart Reviewed: Yes  Additional Pertinent Hx: 74year old female referred to PT with diagnosis of pain in left scapula. 21 MRI was summarized as negative MRI of the left scapula and periscapular soft tissues. Response To Previous Treatment: Patient with no complaints from previous session.   Referring Practitioner: Sahil Spears MD  PT Visit Information  PT Insurance Information: Medicare, [facility]  Total # of Visits Approved: 12  Total # of Visits to Date: 9  Progress Note Due Date: 22  Subjective  Subjective: Seems to still hurt a little up here. (points to upper traps on left side)  Pain Screening  Patient Currently in Pain: Yes  Pain Assessment  Pain Assessment: 0-10  Pain Level: 4  Pain Type: Chronic pain  Pain Location: Shoulder  Pain Orientation: Left  Pain Descriptors: Sore;Dull;Discomfort;Aching  Clinical Progression: Gradually improving  Vital Signs  Patient Currently in Pain: Yes       Treatment Activities:    Exercises  Exercise 1: overhead pulleys--5 min warmup  Exercise 2: finger walk with left hand--15  Exercise 3: left arm golfer's stretch--5 reps, 5 sec hold  Exercise 4: scapular retraction with green t-band--20 reps  Exercise 5: rhomboid stretch on post--5 reps, 10 second hold  Exercise 6: I's and T's green t-band x 15  Exercise 7: lat pulls with Nautilus machine (small weight to start)  seated 35 pounds  x 20  Exercise 8: left arm forward bent row--20 reps with 3 pound weight  Exercise 9: alternating bicep curls with 3 pound weight x 15  Exercise 10: left shoulder ER/IR with green band--15 reps  Exercise 11: Paloff press  green t-band x 20  Exercise 12: lower trap arm lifts  x 15  Exercise 13: standing trunk sidebending to left  x 10  Exercise 14: standing trunk rotation to left  x 10 with 5 sec hold  Exercise 15: L-stretch with right arm overhead, left out to side  x 5 with 5 second hold  Exercise 16: Youngblood's pose, right arm on bow and left pulling strings  x 10 with 5 second hold  Exercise 17: PNF diagonal using left arm (\"sword draw\")  x 10 with 5 second hold  Exercise 18: corner stretch--5 reps, 10 second hold  Exercise 19: left arm pect stretch--5 reps, 5 second hold     Assessment:   Conditions Requiring Skilled Therapeutic Intervention  Body structures, Functions, Activity limitations: Decreased ADL status; Decreased strength;Decreased high-level IADLs; Increased pain;Decreased posture  Assessment: Pateint was able to increase resistance with t-band today showing increase strength. She required moderate verbal cues to perform exericses with correct technique and to relax shoulders with movement. She reported decreased pain and increase movement post session. Treatment Diagnosis: pain in left scapula (M25.512)  Prognosis: Fair;Good  REQUIRES PT FOLLOW UP: Yes  Discharge Recommendations: Continue to assess pending progress    Goals:  Short term goals  Time Frame for Short term goals: 3-4 weeks  Short term goal 1: Patient to be independent with HEP. In progress  Short term goal 2: Patient to report less left shoulder pain when first waking in morning. In progress,  Pain is present but decreasing. Short term goal 3: Patient to be routinely avoiding wearing purse on left shoulder. In progress  Long term goals  Time Frame for Long term goals : 4-6 weeks  Long term goal 1: Patient to report less left scapular pain when reaching overhead. In progress  Long term goal 2: Patient to have >=4+/5 for left shoulder flexors, ER's and elbow flexors. In progress  Long term goal 3: Patient to score <= 5% impairment on the general use portion of the QuickDASH.    In progress 6.8 % impairment  Plan:    Plan  Times per week: 2x per week  Plan weeks: 4-6 weeks  Current Treatment Recommendations: Strengthening,ROM,Home Exercise Program,Patient/Caregiver Education & Training,Modalities,Pain Management,Manual Therapy - Soft Tissue Mobilization  Timed Code Treatment Minutes: 47 Minutes     Therapy Time   Individual Concurrent Group Co-treatment   Time In 0902         Time Out 2771         Minutes 54         Timed Code Treatment Minutes: 47 Minutes  Electronically signed by Vijay Whaley PTA on 3/21/2022 at 3:32 PM

## 2022-03-23 ENCOUNTER — HOSPITAL ENCOUNTER (OUTPATIENT)
Dept: PHYSICAL THERAPY | Age: 67
Setting detail: THERAPIES SERIES
Discharge: HOME | End: 2022-03-23
Payer: MEDICARE

## 2022-03-23 PROCEDURE — 97110 THERAPEUTIC EXERCISES: CPT

## 2022-03-23 ASSESSMENT — PAIN DESCRIPTION - PROGRESSION: CLINICAL_PROGRESSION: GRADUALLY IMPROVING

## 2022-03-23 ASSESSMENT — PAIN SCALES - GENERAL: PAINLEVEL_OUTOF10: 2

## 2022-03-23 ASSESSMENT — PAIN DESCRIPTION - FREQUENCY: FREQUENCY: INTERMITTENT

## 2022-03-23 ASSESSMENT — PAIN DESCRIPTION - PAIN TYPE: TYPE: CHRONIC PAIN

## 2022-03-23 ASSESSMENT — PAIN DESCRIPTION - DESCRIPTORS: DESCRIPTORS: ACHING;DULL

## 2022-03-23 ASSESSMENT — PAIN DESCRIPTION - ORIENTATION: ORIENTATION: LEFT

## 2022-03-23 ASSESSMENT — PAIN DESCRIPTION - LOCATION: LOCATION: SHOULDER

## 2022-03-23 NOTE — PROGRESS NOTES
Physical Therapy  Daily Treatment Note  Date: 3/23/2022  Patient Name: Macario Piper  MRN: [id number]     :   1955    Subjective:   General  Chart Reviewed: Yes  Additional Pertinent Hx: 74year old female referred to PT with diagnosis of pain in left scapula. 21 MRI was summarized as negative MRI of the left scapula and periscapular soft tissues. Response To Previous Treatment: Patient with no complaints from previous session. Referring Practitioner: Cecille Olszewski, MD  PT Visit Information  PT Insurance Information: Medicare, [place] [employer] [place]  Total # of Visits Approved: 12  Total # of Visits to Date: 13  Progress Note Due Date: 22  Subjective  Subjective: Patient reports that her left shoulder is feeling stronger. She was using hand clippers and that aggravated her upper back. General Comment  Comments: CT scan showed a large calcified central disc protrusion at T6-T7 resulting in a moderate to severe spinal stenosis.   Pain Screening  Patient Currently in Pain: Yes  Pain Assessment  Pain Assessment: 0-10  Pain Level: 2  Pain Type: Chronic pain  Pain Location: Shoulder  Pain Orientation: Left  Pain Descriptors: Aching;Dull  Pain Frequency: Intermittent  Clinical Progression: Gradually improving  Vital Signs  Patient Currently in Pain: Yes       Treatment Activities:                                    Exercises  Exercise 1: overhead pulleys--5 min warmup  Exercise 2: finger walk with left hand--15  Exercise 3: left arm golfer's stretch--5 reps, 5 sec hold  Exercise 4: scapular retraction with green t-band--20 reps  Exercise 5: rhomboid stretch on post--5 reps, 10 second hold  Exercise 6: I's and T's green t-band x 15  Exercise 7: lat pulls with Nautilus machine (small weight to start)  seated 35 pounds  x 20  Exercise 8: left arm forward bent row--20 reps with 4 pound weight  Exercise 9: alternating bicep curls with 3 pound weight x 15  Exercise 10: left shoulder ER/IR with green Patient to report less left scapular pain when reaching overhead. In progress  Long term goal 2: Patient to have >=4+/5 for left shoulder flexors, ER's and elbow flexors. In progress  Long term goal 3: Patient to score <= 5% impairment on the general use portion of the QuickDASH.    In progress 6.8 % impairment  Plan:    Plan  Times per week: 2x per week  Plan weeks: 4-6 weeks  Current Treatment Recommendations: Strengthening,ROM,Home Exercise Program,Patient/Caregiver Education & Training,Modalities,Pain Management,Manual Therapy - Soft Tissue Mobilization  Timed Code Treatment Minutes: 64 Minutes     Therapy Time   Individual Concurrent Group Co-treatment   Time In 0800         Time Out 0900         Minutes 60         Timed Code Treatment Minutes: 56 Minutes  Electronically signed by Erendira Zheng PT on 3/23/2022 at 9:43 AM

## 2022-03-24 ENCOUNTER — HOSPITAL ENCOUNTER (OUTPATIENT)
Dept: WOMENS IMAGING | Age: 67
Discharge: HOME | End: 2022-03-24
Payer: MEDICARE

## 2022-03-24 DIAGNOSIS — Z12.31 ENCOUNTER FOR SCREENING MAMMOGRAM FOR MALIGNANT NEOPLASM OF BREAST: ICD-10-CM

## 2022-03-24 DIAGNOSIS — Z78.0 ASYMPTOMATIC MENOPAUSE: ICD-10-CM

## 2022-03-24 LAB
INR BLD: 2.92 (ref 0.88–1.18)
PROTHROMBIN TIME: 30 SEC (ref 12–14.6)

## 2022-03-24 PROCEDURE — 77063 BREAST TOMOSYNTHESIS BI: CPT

## 2022-03-24 PROCEDURE — 77080 DXA BONE DENSITY AXIAL: CPT

## 2022-03-28 ENCOUNTER — HOSPITAL ENCOUNTER (OUTPATIENT)
Dept: PHYSICAL THERAPY | Age: 67
Setting detail: THERAPIES SERIES
Discharge: HOME | End: 2022-03-28
Payer: MEDICARE

## 2022-03-28 PROCEDURE — 97110 THERAPEUTIC EXERCISES: CPT

## 2022-03-28 ASSESSMENT — PAIN DESCRIPTION - PROGRESSION: CLINICAL_PROGRESSION: GRADUALLY IMPROVING

## 2022-03-28 ASSESSMENT — PAIN DESCRIPTION - DESCRIPTORS: DESCRIPTORS: ACHING;DULL

## 2022-03-28 ASSESSMENT — PAIN DESCRIPTION - ORIENTATION: ORIENTATION: LEFT

## 2022-03-28 ASSESSMENT — PAIN DESCRIPTION - LOCATION: LOCATION: SHOULDER

## 2022-03-28 ASSESSMENT — PAIN DESCRIPTION - PAIN TYPE: TYPE: CHRONIC PAIN

## 2022-03-28 ASSESSMENT — PAIN SCALES - GENERAL: PAINLEVEL_OUTOF10: 4

## 2022-03-28 NOTE — PROGRESS NOTES
Daily Treatment Note  Date: 3/28/2022  Patient Name: Ge Rivero  MRN: [id number]     :   1955    Subjective:   General  Chart Reviewed: Yes  Additional Pertinent Hx: 74year old female referred to PT with diagnosis of pain in left scapula. 21 MRI was summarized as negative MRI of the left scapula and periscapular soft tissues. Response To Previous Treatment: Patient with no complaints from previous session.   Referring Practitioner: Jose Rivera MD  PT Visit Information  PT Insurance Information: Medicare, [place] [employer] [place]  Total # of Visits Approved: 15  Total # of Visits to Date: 6  Progress Note Due Date: 22  Subjective  Subjective: My pain has eased up some this morning from when I firt got up  Pain Screening  Patient Currently in Pain: Yes  Pain Assessment  Pain Level: 4  Pain Type: Chronic pain  Pain Location: Shoulder  Pain Orientation: Left  Pain Descriptors: Aching;Dull  Clinical Progression: Gradually improving  Vital Signs  Patient Currently in Pain: Yes       Treatment Activities:   Exercises  Exercise 1: overhead pulleys--5 min warmup  Exercise 2: finger walk with left hand--15  Exercise 3: left arm golfer's stretch--5 reps, 5 sec hold  Exercise 4: scapular retraction with green t-band--25 reps  Exercise 5: rhomboid stretch on post--5 reps, 10 second hold  Exercise 6: I's and T's green t-band x 20  Exercise 7: lat pulls with Nautilus machine (small weight to start)  seated 35 pounds  x 20  Exercise 8: left arm forward bent row--10 reps with 5 pound weight  Exercise 9: alternating bicep curls with 3 pound weight x 20  Exercise 10: left shoulder ER/IR with green band--20 reps  Exercise 11: Paloff press  green t-band x 20  Exercise 12: lower trap arm lifts  x 15  Exercise 13: standing trunk sidebending to left 3# x 10  Exercise 14: standing trunk rotation to left  x 15 with 5 sec hold using green band  Exercise 15: L-stretch with right arm overhead, left out to side  x 5 with 5 second hold  Exercise 16: Youngblood's pose, right arm on bow and left pulling strings red  x 10 with 5 second hold  Exercise 17: PNF diagonal using left arm (\"sword draw\")  x 15 with 5 second hold  Exercise 18: corner stretch--5 reps, 10 second hold  Exercise 19: left arm pect stretch--5 reps, 10 second hold  Exercise 20: supine lying on rolled towel or rigid pad x 3 minute     Assessment:   Conditions Requiring Skilled Therapeutic Intervention  Body structures, Functions, Activity limitations: Decreased ADL status; Decreased strength;Decreased high-level IADLs; Increased pain;Decreased posture  Assessment: Patient presents with contined L shoulder/scapula pain this morning but tolerates all exercises well without c/o increased pain. Therapist increased reps and resistance of some exercises this date with patient having no issues. Continued cues needed to ensure correct mm engagement for max benefit. Will continue with current POC and progress as able. Treatment Diagnosis: pain in left scapula (M25.512)  Prognosis: Fair;Good  REQUIRES PT FOLLOW UP: Yes  Discharge Recommendations: Continue to assess pending progress      Goals:  Short term goals  Time Frame for Short term goals: 3-4 weeks  Short term goal 1: Patient to be independent with HEP. In progress  Short term goal 2: Patient to report less left shoulder pain when first waking in morning. In progress,  Pain is present but decreasing. Short term goal 3: Patient to be routinely avoiding wearing purse on left shoulder. In progress  Long term goals  Time Frame for Long term goals : 4-6 weeks  Long term goal 1: Patient to report less left scapular pain when reaching overhead. In progress  Long term goal 2: Patient to have >=4+/5 for left shoulder flexors, ER's and elbow flexors. In progress  Long term goal 3: Patient to score <= 5% impairment on the general use portion of the QuickDASH.    In progress 6.8 % impairment    Plan:    Plan  Times per week: 2x per week  Plan weeks: 4-6 weeks  Current Treatment Recommendations: Strengthening,ROM,Home Exercise Program,Patient/Caregiver Education & Training,Modalities,Pain Management,Manual Therapy - Soft Tissue Mobilization  Timed Code Treatment Minutes: 62 Minutes     Therapy Time   Individual Concurrent Group Co-treatment   Time In 0900         Time Out 0958         Minutes 58         Timed Code Treatment Minutes: 62 Minutes       Electronically signed by Hilda De Souza PTA on 3/28/2022 at 12:59 PM

## 2022-03-30 ENCOUNTER — HOSPITAL ENCOUNTER (OUTPATIENT)
Dept: PHYSICAL THERAPY | Age: 67
Setting detail: THERAPIES SERIES
Discharge: HOME | End: 2022-03-30
Payer: MEDICARE

## 2022-03-30 VITALS — SYSTOLIC BLOOD PRESSURE: 127 MMHG | DIASTOLIC BLOOD PRESSURE: 72 MMHG | HEART RATE: 81 BPM | OXYGEN SATURATION: 100 %

## 2022-03-30 PROCEDURE — 97530 THERAPEUTIC ACTIVITIES: CPT

## 2022-03-30 ASSESSMENT — PAIN DESCRIPTION - DESCRIPTORS: DESCRIPTORS: CONSTANT

## 2022-03-30 ASSESSMENT — PAIN DESCRIPTION - PROGRESSION: CLINICAL_PROGRESSION: GRADUALLY IMPROVING

## 2022-03-30 ASSESSMENT — PAIN DESCRIPTION - LOCATION: LOCATION: BACK

## 2022-03-30 ASSESSMENT — PAIN SCALES - GENERAL: PAINLEVEL_OUTOF10: 4

## 2022-03-30 ASSESSMENT — PAIN DESCRIPTION - PAIN TYPE: TYPE: CHRONIC PAIN

## 2022-03-30 ASSESSMENT — PAIN DESCRIPTION - ORIENTATION: ORIENTATION: POSTERIOR

## 2022-03-30 ASSESSMENT — PAIN DESCRIPTION - ONSET: ONSET: AWAKENED FROM SLEEP

## 2022-03-30 ASSESSMENT — PAIN DESCRIPTION - FREQUENCY: FREQUENCY: CONTINUOUS

## 2022-03-30 NOTE — PROGRESS NOTES
Physical Therapy  Daily Treatment Note  Date: 3/30/2022  Patient Name: Myra Woods  MRN: [id number]     :   1955    Subjective:   General  Chart Reviewed: Yes  Additional Pertinent Hx: 74year old female referred to PT with diagnosis of pain in left scapula. 21 MRI was summarized as negative MRI of the left scapula and periscapular soft tissues. Response To Previous Treatment: Patient with no complaints from previous session. Referring Practitioner: Onnie Soulier, MD  PT Visit Information  PT Insurance Information: Medicare, [place] [employer] [place]  Total # of Visits Approved: 15  Total # of Visits to Date: 15  Progress Note Due Date: 21  Subjective  Subjective: Pain 4/10. She relates raking yard yesterday.   Pain Screening  Patient Currently in Pain: Yes  Pain Assessment  Pain Assessment: 0-10  Pain Level: 4  Pain Type: Chronic pain  Pain Location: Back  Pain Orientation: Posterior  Pain Descriptors: Constant  Pain Frequency: Continuous  Pain Onset: Awakened from sleep  Clinical Progression: Gradually improving  Vital Signs  Pulse: 81  BP: 127/72  Patient Currently in Pain: Yes  Oxygen Therapy  SpO2: 100 %       Treatment Activities:                                    Exercises  Exercise 1: overhead pulleys--5 min warmup  Exercise 2: finger walk with left hand--15  Exercise 3: left arm golfer's stretch--5 reps, 5 sec hold  Exercise 4: scapular retraction with green t-band--25 reps  Exercise 5: rhomboid stretch on post--5 reps, 10 second hold  Exercise 6: I's and T's green t-band x 20  Exercise 7: lat pulls with Nautilus machine (small weight to start)  seated 35 pounds  x 20  Exercise 8: left arm forward bent row--10 reps with 5 pound weight  Exercise 9: alternating bicep curls with 3 pound weight x 20  Exercise 10: left shoulder ER/IR with green band--20 reps  Exercise 11: Paloff press  green t-band x 20  Exercise 12: lower trap arm lifts  x 15  Exercise 13: standing trunk sidebending to left 3# x 10  Exercise 14: standing trunk rotation to left  x 15 with 5 sec hold using green band  Exercise 15: L-stretch with right arm overhead, left out to side  x 5 with 5 second hold  Exercise 16: Youngblood's pose, right arm on bow and left pulling strings red  x 10 with 5 second hold  Exercise 17: PNF diagonal using left arm (\"sword draw\")  x 15 with 5 second hold  not today  Exercise 18: corner stretch--5 reps, 10 second hold  Exercise 19: left arm pect stretch--5 reps, 10 second hold  not today  Exercise 20: supine lying on rolled towel or rigid pad x 3 minute  nicole today                               Assessment:   Conditions Requiring Skilled Therapeutic Intervention  Body structures, Functions, Activity limitations: Decreased ADL status; Decreased strength;Decreased high-level IADLs; Increased pain;Decreased posture  Assessment: She rates pain at 4/10 upon arrival to PT today. She is fitted [facility] posture corrector. She performs ther ex with corrector in place. She relates no discomfort with brace. Pain when leaving 2/10. Treatment Diagnosis: pain in left scapula (M25.512)  Prognosis: Fair;Good  REQUIRES PT FOLLOW UP: Yes  Discharge Recommendations: Continue to assess pending progress    Goals:  Short term goals  Time Frame for Short term goals: 3-4 weeks  Short term goal 1: Patient to be independent with HEP. In progress  Short term goal 2: Patient to report less left shoulder pain when first waking in morning. In progress,  Pain is present but decreasing. Short term goal 3: Patient to be routinely avoiding wearing purse on left shoulder. In progress  Long term goals  Time Frame for Long term goals : 4-6 weeks  Long term goal 1: Patient to report less left scapular pain when reaching overhead. In progress  Long term goal 2: Patient to have >=4+/5 for left shoulder flexors, ER's and elbow flexors.    In progress  Long term goal 3: Patient to score <= 5% impairment on the general use portion of the

## 2022-03-31 ENCOUNTER — TELEPHONE (OUTPATIENT)
Dept: VASCULAR SURGERY | Age: 67
End: 2022-03-31

## 2022-03-31 DIAGNOSIS — M79.89 SWELLING OF LIMB: ICD-10-CM

## 2022-03-31 DIAGNOSIS — M79.605 LEFT LEG PAIN: Primary | ICD-10-CM

## 2022-03-31 NOTE — TELEPHONE ENCOUNTER
Left the patient a message and ask that she call us regarding her surgery schedule and instructions.

## 2022-04-01 NOTE — TELEPHONE ENCOUNTER
Spoke with patient regarding the upcoming instructions for her surgery on 2022. They are below. Nani Adam    Surgery Directions    1. Report to the [facility] at [facility] (go in the front door and to the left) on 2022, at 6:00 AM .  2. Nothing to eat or drink after midnight the night before the procedure. 3. Please take all medications as normally scheduled to take, including heart and blood pressure medicines with a sip of water. Except Coumadin. 4. Do not take Lasix, insulin, or any diabetic medicine the morning of the procedure. If you take insulin, you may only take 1/2 of any scheduled nightly dose, and none the morning of the procedure. You may take all regularly scheduled heart, cholesterol, and blood pressure medicines with a sip of water. 5. If you take Glucophage, Metformin, Actos Plus Met, or Glucovance,you can not take this the day of your procedure and two days after the procedure. You will need to have a lab test done two days after the procedure before you restart this medicine. 6. Hold Coumadin the night prior to your procedure. 7. If you have sleep apnea and require C-PAP, please bring this with you to the hospital.  8. Bring a list of all of your allergies and medications with you to the hospital.  9. Please let our nurse know if you have had an allergy to iodine, shellfish, or x-ray dye. 10. Let the nurse know if you take any of the followin. Over the counter herbal supplements  2. Diclofenec, indomethacin, ketoprofen, Caridopa/levadopa, naproxen, sulindac, piroxicam, glucosamine, Chondrotin, cocchine, or methotrexate. 11. Plan to stay at the hospital for 4 - 6 hours before being released  by the physician. You will need someone to drive you home after the procedure. 12. Medications instructed to hold: See above. 13. Please stop at your local [facility] or pharmacy and buy a bottle of Hibiclens.  Wash thoroughly with this the night before and the morning of the procedure, paying special attention to the area that will be operated on. Make sure you rinse very well. The Hibiclens should only be used prior to surgery. 15. New policy requires that anyone who comes into the hospital will be required to wear a face mask. A cloth mask is acceptable. 15. To ensure the health and safety of our patients and staff, [facility] has implemented visitor restrictions. Only one person will be allowed to accompany you for your procedure. If you or your visitor are exhibiting signs & symptoms of illness such as fever, cough, sore throat or body aches, we ask that you reschedule your procedure to a later date after your symptoms have been resolved. 14. Other Directions: For any questions or concerns please contact our office at [phone] and ask for Raz Linton. Unless instructed otherwise by your physician, cleanse incision/puncture site twice daily with soap and water. Apply dry gauze. Do not get in tub. Okay to shower. Do not apply any salve, cream, peroxide or alcohol to the incision. Call with any increasing redness or drainage    PLEASE NOTE:  If the patient is unable to sign his/her own paperwork, the appointed caregiver (POA, child, sibling, etc) must be present at the time of registration for all testing and procedures. Transportation to and from all testing and procedure appointments is the sole responsibility of the patient, caregiver, and/or nursing facility in which they reside. Please remember you will not be able to drive after you are discharged. Please call the office at [phone] with any questions or concerns. Please allow 48-72 hours notice for cancellations or rescheduling. We will attempt to accommodate your needs to the best of our capabilities, however, strict policies with procedure room availability does not allow much flexibility.

## 2022-04-04 ENCOUNTER — HOSPITAL ENCOUNTER (OUTPATIENT)
Dept: PHYSICAL THERAPY | Age: 67
Setting detail: THERAPIES SERIES
Discharge: HOME | End: 2022-04-04
Payer: MEDICARE

## 2022-04-04 PROCEDURE — 97110 THERAPEUTIC EXERCISES: CPT

## 2022-04-04 ASSESSMENT — PAIN DESCRIPTION - LOCATION: LOCATION: SHOULDER

## 2022-04-04 ASSESSMENT — PAIN DESCRIPTION - ORIENTATION: ORIENTATION: POSTERIOR;LEFT

## 2022-04-04 ASSESSMENT — PAIN DESCRIPTION - PAIN TYPE: TYPE: CHRONIC PAIN

## 2022-04-04 ASSESSMENT — PAIN SCALES - GENERAL: PAINLEVEL_OUTOF10: 3

## 2022-04-04 NOTE — PROGRESS NOTES
Physical Therapy  Daily Treatment Note  Date: 2022  Patient Name: Soto Goel  MRN: [id number]     :   1955    Subjective:   General  Referring Practitioner: Rene Richards MD  PT Insurance Information: Medicare, [facility]  Total # of Visits Approved: 15  Total # of Visits to Date: 15  Progress Note Due Date: 22  Subjective: the pain is more on the back of my shoulder  Patient Currently in Pain: Yes  Pain Level: 3  Pain Type: Chronic pain  Pain Location: Shoulder  Pain Orientation: Posterior; Left       Treatment Activities:     Exercises  Exercise 1: overhead pulleys--5 min warmup  Exercise 2: finger walk with left hand--15  Exercise 3: left arm golfer's stretch--5 reps, 5 sec hold  Exercise 4: rhomboid stretch on post--5 reps, 10 second hold  Exercise 5: scapular retraction with green t-band--25 reps  Exercise 6: I's and T's green t-band x 20  Exercise 7: left shoulder ER/IR with green band--20 reps  Exercise 8: Paloff press  green t-band x 20  Exercise 9: lat pulls with Nautilus machine (small weight to start)  seated 35 pounds  x 20  Exercise 10: left arm forward bent row--10 reps with 5 pound weight  Exercise 11: alternating bicep curls with 3 pound weight x 20  Exercise 12: lower trap arm lifts  x 15  Exercise 13: standing trunk sidebending to left 3# x 10  Exercise 14: standing trunk rotation to left  x 15 with 5 sec hold using green band  Exercise 15: L-stretch with right arm overhead, left out to side  x 5 with 5 second hold  Exercise 16: Youngblood's pose, right arm on bow and left pulling strings red  x 10 with 5 second hold  Exercise 17: PNF diagonal using left arm (\"sword draw\")  x 15 with 5 second hold  Exercise 18: corner stretch--5 reps, 10 second hold  Exercise 19: left arm pect stretch--5 reps, 10 second hold  Exercise 20: supine lying on rolled towel or rigid pad x 3 minute  nicole today     Assessment:   Conditions Requiring Skilled Therapeutic Intervention  Body

## 2022-04-05 ENCOUNTER — PREP FOR PROCEDURE (OUTPATIENT)
Dept: VASCULAR SURGERY | Age: 67
End: 2022-04-05

## 2022-04-05 DIAGNOSIS — M79.605 LEFT LEG PAIN: ICD-10-CM

## 2022-04-05 DIAGNOSIS — M79.89 SWELLING OF LIMB: ICD-10-CM

## 2022-04-05 DIAGNOSIS — I83.812 VARICOSE VEINS OF LEFT LOWER EXTREMITY WITH PAIN: Primary | ICD-10-CM

## 2022-04-05 RX ORDER — SODIUM CHLORIDE 0.9 % (FLUSH) 0.9 %
10 SYRINGE (ML) INJECTION PRN
Status: CANCELLED | OUTPATIENT
Start: 2022-04-05

## 2022-04-05 RX ORDER — SODIUM CHLORIDE 9 MG/ML
INJECTION, SOLUTION INTRAVENOUS CONTINUOUS
Status: CANCELLED | OUTPATIENT
Start: 2022-04-05

## 2022-04-05 NOTE — H&P (VIEW-ONLY)
Patient Care Team:  Hermilo Cevallos DO as PCP - General (Family Medicine)  Hermilo Cevallos DO as PCP - [facility] Empaneled Provider  Nidhi Sanches DO as Consulting Physician (Gastroenterology)      History and Physical:    Mrs. Dillon Saldaña is a 38-year-old female has a history that includes factor V Leiden on long-term anticoagulation, GERD, hypertension, past tobacco abuse. She comes in today as referral from [place], [place]. For evaluation of leg pain. She approximately 2 weeks ago underwent a dental procedure and was told to hold her Coumadin. A couple of days after that she developed swelling pain and redness and multiple varicosities in the left medial calf and thigh area. She had a ultrasound that showed no evidence of DVT with evidence of superficial thrombosis and varicose veins in the medial calf this was done on 2/14/2022 at [address] [place] [address]. On 2/13/2022 she had a CTA of her chest at [address] [place] [address] which showed pulmonary embolus in the middle 0.2 lateral segments of the right middle lobe. She is currently back on her Coumadin and in the therapeutic level. She is currently on pravastatin 10 mg daily. She reports that she has been using Tylenol for pain, heating pad and has been wearing compression stockings. Kristen Fernandez is a 74 y.o. female with the following history reviewed and recorded in [facility]:  Patient Active Problem List    Diagnosis Date Noted    DDD (degenerative disc disease), lumbar     Cancer ([facility] [address]      skin cancer      HTN (hypertension)     Trigeminal neuralgia     Factor V Leiden ([facility] [address]     Galaviz's esophagus without dysplasia 10/22/2013    Family hx of colon cancer 10/22/2013    Gastroesophageal reflux disease without esophagitis 10/22/2013     Current Outpatient Medications   Medication Sig Dispense Refill    HYDROcodone-acetaminophen (NORCO) 5-325 MG per tablet Take 1 tablet by mouth every 8 hours as needed for Pain.       alendronate (FOSAMAX) 70 MG tablet Take 70 mg by mouth every 7 days      pravastatin (PRAVACHOL) 10 MG tablet Take 10 mg by mouth daily (Patient not taking: Reported on 2/23/2022)      topiramate (TOPAMAX) 25 MG tablet Take 25 mg by mouth 2 times daily (Patient not taking: Reported on 2/23/2022)      diclofenac sodium (VOLTAREN) 1 % GEL Apply topically 2 times daily (Patient not taking: Reported on 2/23/2022)      Glucosamine-Chondroitin 250-200 MG CAPS Take by mouth      vitamin B-12 (CYANOCOBALAMIN) 1000 MCG tablet Take 5,000 mcg by mouth daily      warfarin (COUMADIN) 6 MG tablet Take 7 mg by mouth daily       NIFEdipine (ADALAT CC) 30 MG CR tablet Take 60 mg by mouth daily       Calcium Carbonate-Vitamin D (CALCIUM + D) 600-200 MG-UNIT TABS Take 1 tablet by mouth 2 times daily.  Glucosamine-Chondroit-Vit C-Mn (GLUCOSAMINE CHONDR 1500 COMPLX PO) Take 1 tablet by mouth daily. (Patient not taking: Reported on 2/23/2022)      cyclobenzaprine (FLEXERIL) 5 MG tablet Take 5 mg by mouth 3 times daily as needed         No current facility-administered medications for this visit.      Allergies: Codeine  Past Medical History:   Diagnosis Date    Cancer ([facility] [address]     skin cancer    DDD (degenerative disc disease), lumbar     Factor V Leiden ([facility] [address]     GERD (gastroesophageal reflux disease)     History of skin cancer     HTN (hypertension)     Neuritis     Tinnitus     Trigeminal neuralgia      Past Surgical History:   Procedure Laterality Date    CARPAL TUNNEL RELEASE      right    CERVICAL POLYP REMOVAL      COLONOSCOPY  12/08    Dr Josselin Henley COLONOSCOPY  11/18/2013    Dr. Marcie Ward      corrective    JOINT REPLACEMENT Right     knee    AR EGD TRANSORAL BIOPSY SINGLE/MULTIPLE N/A 12/27/2016    Dr Karyn Vargas-(-) Galaviz's, 3 yr recall     UPPER GASTROINTESTINAL ENDOSCOPY  10/10    UPPER GASTROINTESTINAL ENDOSCOPY  11/18/2013    Dr Karyn Vargas     Family History   Problem Relation Age of Onset    Breast Cancer Mother 76    Cancer Father     Colon Cancer Paternal Grandmother     Colon Polyps Paternal Grandmother      Social History     Tobacco Use    Smoking status: Former Smoker     Packs/day: 1.50     Years: 10.00     Pack years: 15.00     Types: Cigarettes     Quit date: 1995     Years since quittin.2    Smokeless tobacco: Never Used    Tobacco comment: quit over 30 yrs   Substance Use Topics    Alcohol use: Yes     Comment: once or twice a year       Old records obtained from the referring provider. These records have been reviewed and summarized. Review of Systems    Constitutional  no significant activity change, appetite change, or unexpected weight change. No fever or chills. No diaphoresis or significant fatigue. HENT  no significant rhinorrhea or epistaxis. No tinnitus or significant hearing loss. Eyes  no sudden vision change or amaurosis. Respiratory  no significant shortness of breath, wheezing, or stridor. No apnea, cough, or chest tightness associated with shortness of breath. Cardiovascular  no chest pain, syncope, or significant dizziness. No palpitations. She reports leg swelling. No claudication. Gastrointestinal  no abdominal swelling or pain. No blood in stool. No severe constipation, diarrhea, nausea, or vomiting. Genitourinary  No difficulty urinating, dysuria, frequency, or urgency. No flank pain or hematuria. Musculoskeletal  no back pain, gait disturbance, or myalgia. Skin  no color change, rash, pallor, or new wound. Neurologic  no dizziness, facial asymmetry, or light headedness. No seizures. No speech difficulty or lateralizing weakness. Hematologic  no easy bruising or excessive bleeding. Psychiatric  no severe anxiety or nervousness. No confusion. All other review of systems are negative. Physical Exam    Constitutional  well developed, well nourished. No diaphoresis or acute distress. HENT  head normocephalic.   Right external ear canal appears normal.  Left external ear canal appears normal.  Septum appears midline. Eyes  conjunctiva normal.  EOMS normal.  No exudate. No icterus. Neck- ROM appears normal, no tracheal deviation. Cardiovascular  Regular rate and rhythm. Heart sounds are normal.  No murmur, rub, or gallop. Carotid pulses are 2+ to palpation bilaterally without bruit. Extremities - Radial and brachial pulses are 2+ to palpation bilaterally. DP and PT pulses are palpable. No cyanosis or clubbing. No signs atheroembolic event. She has multiple varicosities noted mainly on her left lower extremity with some swelling noted she has erythema swelling and induration noted mainly in the left medial calf and thigh area over the varicosities. Less varicosities noted right lower extremity. No wounds noted at this time. Pulmonary  effort appears normal.  No respiratory distress. Lungs - Breath sounds normal. No wheezes or rales. GI - Abdomen  soft, non tender, bowel sounds X 4 quadrants. No guarding or rebound tenderness. No distension or palpable mass. Genitourinary  deferred. Musculoskeletal  ROM appears normal.  Neurologic  alert and oriented X 3. Physiologic. Skin  warm, dry, and intact. No rash, erythema, or pallor. Psychiatric  mood, affect, and behavior appear normal.  Judgment and thought processes appear normal.      Assessment      1. Varicose veins of left lower extremity with pain    2. Left leg pain    3. Swelling of limb          Plan      Support hose  20-30 mmHg compression daily. I explained to her that she could wear thigh-high compression stockings if that was more comfortable in relationship to where her painful varicosities were located  Recommend leg elevation above the level of the heart  Continue Coumadin as directed by PCP  Recommend moist heat for symptomatic relief of pain in the affected area  We will obtain a bilateral lower extremity venous scan for reflux.   After we have reviewed the imaging we will call her with results and further recommendations    Addendum:  Bilateral lower extremity venous scan for reflux -reviewed by Dr. Eri Diaz        Right Side: The greater saphenous vein exhibits reflux lasting for a    maximum of 6598 milliseconds in the saphenofemoral junction. There is no    evidence of deep venous thrombosis in the segments insonated. There is no    deep vein reflux. There is 5039 seconds of short saphenous vein reflux,    mid calf.        Left Side: The greater saphenous vein exhibits reflux lasting for a    maximum of 6037 milliseconds in the saphenofemoral junction. There is no    evidence of deep venous thrombosis in the segments insonated. There is no    deep vein reflux. There is no short saphenous vein reflux.        There is no evidence of deep venous thrombosis (DVT) in the right lower    extremity.   Suann Pillar is no evidence of superficial thrombophlebitis of the right lower    extremity.        There is evidence of chronic, partially occlusive deep vein thrombosis in    the left lower extremity involving the peroneal vein.    Chronic appearing superficial thrombophlebitis is seen in the great    saphenous vein of the left lower calf.        Signature        ----------------------------------------------------------------    Electronically signed by Molly Whitney MD(Interpreting    physician) on 03/01/2022 03:07 PM    ----------------------------------------------------------------       Velocities are measured in cm/s ; Diameters are measured in mm       Right Doppler Measurements   +----------------------------------------+------+------+-------------------+   ! Location                                !Signal!Reflux! Reflux (msec)      !   +----------------------------------------+------+------+-------------------+   ! Sapheno Femoral Junction                !      !      !8055               ! +----------------------------------------+------+------+-------------------+   ! GSV 2 cm Distal to Junction             !      !      !4623               !   +----------------------------------------+------+------+-------------------+   ! GSV Mid Thigh                           !      !      !0301               !   +----------------------------------------+------+------+-------------------+   ! GSV Knee                                !      !      !1727               !   +----------------------------------------+------+------+-------------------+   ! SSV High Calf                           !      !      !0                  !   +----------------------------------------+------+------+-------------------+   ! SSV Mid Calf                            !      !      !5976               !   +----------------------------------------+------+------+-------------------+   ! SSV Ankle                               !      !      !3077               !   +----------------------------------------+------+------+-------------------+       Left Doppler Measurements   +----------------------------------------+------+------+-------------------+   ! Location                                !Signal!Reflux! Reflux (msec)      !   +----------------------------------------+------+------+-------------------+   ! Sapheno Femoral Junction                !      !      !8645               !   +----------------------------------------+------+------+-------------------+   ! GSV 2 cm Distal to Junction             !      !      !9459               !   +----------------------------------------+------+------+-------------------+   ! GSV Mid Thigh                           !      !      !5304               !   +----------------------------------------+------+------+-------------------+   ! GSV Knee                                !      !      !2347               !   +----------------------------------------+------+------+-------------------+       Right Mapping +----------------------------------------------+----------+----------------+   ! Location                                      !AP Diam   ! Trans Diam      !   +----------------------------------------------+----------+----------------+   ! Sapheno Femoral Junction                      !12.1      !                !   +----------------------------------------------+----------+----------------+   ! GSV 2 cm Distal to Junction                   !          !6.3             !   +----------------------------------------------+----------+----------------+   ! GSV Mid Thigh                                 !          !5. 1             !   +----------------------------------------------+----------+----------------+   ! GSV Knee                                      !          !4. 3             !   +----------------------------------------------+----------+----------------+   ! SSV High Calf                                 !          !1. 5             !   +----------------------------------------------+----------+----------------+   ! SSV Mid Calf                                  !          !3. 4             !   +----------------------------------------------+----------+----------------+   ! SSV Ankle                                     !          !2. 3             !   +----------------------------------------------+----------+----------------+       Left Mapping   +----------------------------------------------+----------+----------------+   ! Location                                      !AP Diam   ! Trans Diam      !   +----------------------------------------------+----------+----------------+   ! Sapheno Femoral Junction                      !11.4      !                !   +----------------------------------------------+----------+----------------+   ! GSV 2 cm Distal to Junction                   !          !15.8            !   +----------------------------------------------+----------+----------------+   ! GSV Mid Thigh                                 !          !7.7             !   +----------------------------------------------+----------+----------------+   ! GSV Knee                                      !          !6.7             !   +----------------------------------------------+----------+----------------+   ! SSV High Calf                                 !          !3               !   +----------------------------------------------+----------+----------------+   ! SSV Mid Calf                                  !          !2. 5             !   +----------------------------------------------+----------+----------------+   ! SSV Ankle                                     !          !1. 9             !   +----------------------------------------------+----------+----------------+       Velocities are measured in cm/s ; Diameters are measured in mm       Right Lower Extremities DVT Study Measurements   Right 2D Measurements   +------------------------------------+----------+---------------+----------+   ! Location                            ! Visualized! Compressibility! Thrombosis! +------------------------------------+----------+---------------+----------+   ! Sapheno Femoral Junction            ! Yes       ! Yes            !None      !   +------------------------------------+----------+---------------+----------+   ! Common Femoral                      ! Yes       ! Yes            !None      !   +------------------------------------+----------+---------------+----------+   ! Prox Femoral                        ! Yes       ! Yes            !None      !   +------------------------------------+----------+---------------+----------+   ! Mid Femoral                         ! Yes       ! Yes            !None      !   +------------------------------------+----------+---------------+----------+   ! Dist Femoral                        ! Yes       ! Yes            !None      !   +------------------------------------+----------+---------------+----------+   ! Deep Femoral                        ! Yes       ! Yes            !None      !   +------------------------------------+----------+---------------+----------+   ! Popliteal                           ! Yes       ! Yes            !None      !   +------------------------------------+----------+---------------+----------+   ! SSV                                 ! Yes       ! Yes            !None      !   +------------------------------------+----------+---------------+----------+   ! Gastroc                             ! Yes       ! Yes            !None      !   +------------------------------------+----------+---------------+----------+   ! PTV                                 ! Yes       ! Yes            !None      !   +------------------------------------+----------+---------------+----------+   ! GSV                                 ! Yes       ! Yes            !None      !   +------------------------------------+----------+---------------+----------+   ! ATV                                 ! Yes       ! Yes            !None      !   +------------------------------------+----------+---------------+----------+   ! Peroneal                            ! Yes       ! Yes            !None      !   +------------------------------------+----------+---------------+----------+       Left Lower Extremities DVT Study Measurements   Left 2D Measurements   +------------------------------------+----------+---------------+----------+   ! Location                            ! Visualized! Compressibility! Thrombosis! +------------------------------------+----------+---------------+----------+   ! Sapheno Femoral Junction            ! Yes       ! Yes            !None      !   +------------------------------------+----------+---------------+----------+   ! Common Femoral                      ! Yes       ! Yes            !None      !   +------------------------------------+----------+---------------+----------+   ! Prox Femoral                        ! Yes       ! Yes            !None      ! +------------------------------------+----------+---------------+----------+   ! Mid Femoral                         ! Yes       ! Yes            !None      !   +------------------------------------+----------+---------------+----------+   ! Dist Femoral                        ! Yes       ! Yes            !None      !   +------------------------------------+----------+---------------+----------+   ! Deep Femoral                        ! Yes       ! Yes            !None      !   +------------------------------------+----------+---------------+----------+   ! Popliteal                           ! Yes       ! Yes            !None      !   +------------------------------------+----------+---------------+----------+   ! SSV                                 ! Yes       ! Yes            !None      !   +------------------------------------+----------+---------------+----------+   ! Gastroc                             ! Yes       ! Yes            !None      !   +------------------------------------+----------+---------------+----------+   ! PTV                                 ! Yes       ! Yes            !None      !   +------------------------------------+----------+---------------+----------+   ! GSV                                 ! Yes       !Partial        !          !   +------------------------------------+----------+---------------+----------+   ! ATV                                 ! Yes       ! Yes            !None      !   +------------------------------------+----------+---------------+----------+   ! Peroneal                            ! Yes       !Partial        ! Chronic   !   +------------------------------------+----------+---------------+----------+         Dr. Alfonso Zarco has reviewed the bilateral lower extremity venous scan for reflux.   He recommends proceeding with a radiofrequency ablation left greater saphenous vein since she has failed compression therapy and elevation  Options have been discussed with the patient including continued medical management versus proceeding with radiofrequency ablation left greater saphenous vein. Risks and benefits of the procedure were discussed with the patient including possibility of development of DVT, infection, nerve damage, bleeding, allergic reaction to anesthetic  She verbalizes understanding and wishes to proceed with procedure. We will submit for approval through her insurance company, if approved we will plan to proceed      Proceed with radiofrequency ablation left greater saphenous vein    Please note that parts of the chart were generated using Dragon dictation software. Although every effort was made to ensure the accuracy of this automated transcription, some errors in transcription may have occurred.

## 2022-04-05 NOTE — H&P
Patient Care Team:  Grazyna Martinez DO as PCP - General (Family Medicine)  Grazyna Martinez DO as PCP - [facility] Empaneled Provider  Trish Jackson DO as Consulting Physician (Gastroenterology)      History and Physical:    Mrs. Anastasia Ibrahim is a 67-year-old female has a history that includes factor V Leiden on long-term anticoagulation, GERD, hypertension, past tobacco abuse. She comes in today as referral from [place], [place]. For evaluation of leg pain. She approximately 2 weeks ago underwent a dental procedure and was told to hold her Coumadin. A couple of days after that she developed swelling pain and redness and multiple varicosities in the left medial calf and thigh area. She had a ultrasound that showed no evidence of DVT with evidence of superficial thrombosis and varicose veins in the medial calf this was done on 2/14/2022 at [facility]. On 2/13/2022 she had a CTA of her chest at [facility] which showed pulmonary embolus in the middle 0.2 lateral segments of the right middle lobe. She is currently back on her Coumadin and in the therapeutic level. She is currently on pravastatin 10 mg daily. She reports that she has been using Tylenol for pain, heating pad and has been wearing compression stockings. Kindra Montanez is a 74 y.o. female with the following history reviewed and recorded in [facility]:  Patient Active Problem List    Diagnosis Date Noted    DDD (degenerative disc disease), lumbar     Cancer ([facility] [address]      skin cancer      HTN (hypertension)     Trigeminal neuralgia     Factor V Leiden ([facility] [address]     Galaviz's esophagus without dysplasia 10/22/2013    Family hx of colon cancer 10/22/2013    Gastroesophageal reflux disease without esophagitis 10/22/2013     Current Outpatient Medications   Medication Sig Dispense Refill    HYDROcodone-acetaminophen (NORCO) 5-325 MG per tablet Take 1 tablet by mouth every 8 hours as needed for Pain.       alendronate (FOSAMAX) 70 MG tablet Take 70 mg by mouth every 7 days      pravastatin (PRAVACHOL) 10 MG tablet Take 10 mg by mouth daily (Patient not taking: Reported on 2/23/2022)      topiramate (TOPAMAX) 25 MG tablet Take 25 mg by mouth 2 times daily (Patient not taking: Reported on 2/23/2022)      diclofenac sodium (VOLTAREN) 1 % GEL Apply topically 2 times daily (Patient not taking: Reported on 2/23/2022)      Glucosamine-Chondroitin 250-200 MG CAPS Take by mouth      vitamin B-12 (CYANOCOBALAMIN) 1000 MCG tablet Take 5,000 mcg by mouth daily      warfarin (COUMADIN) 6 MG tablet Take 7 mg by mouth daily       NIFEdipine (ADALAT CC) 30 MG CR tablet Take 60 mg by mouth daily       Calcium Carbonate-Vitamin D (CALCIUM + D) 600-200 MG-UNIT TABS Take 1 tablet by mouth 2 times daily.  Glucosamine-Chondroit-Vit C-Mn (GLUCOSAMINE CHONDR 1500 COMPLX PO) Take 1 tablet by mouth daily. (Patient not taking: Reported on 2/23/2022)      cyclobenzaprine (FLEXERIL) 5 MG tablet Take 5 mg by mouth 3 times daily as needed         No current facility-administered medications for this visit.      Allergies: Codeine  Past Medical History:   Diagnosis Date    Cancer ([facility] [address]     skin cancer    DDD (degenerative disc disease), lumbar     Factor V Leiden ([facility] [address]     GERD (gastroesophageal reflux disease)     History of skin cancer     HTN (hypertension)     Neuritis     Tinnitus     Trigeminal neuralgia      Past Surgical History:   Procedure Laterality Date    CARPAL TUNNEL RELEASE      right    CERVICAL POLYP REMOVAL      COLONOSCOPY  12/08    Dr Tomeka Porras COLONOSCOPY  11/18/2013    Dr. Smiley Middleton      corrective    JOINT REPLACEMENT Right     knee    CO EGD TRANSORAL BIOPSY SINGLE/MULTIPLE N/A 12/27/2016    Dr Kaycee Bernard-(-) Galaviz's, 3 yr recall     UPPER GASTROINTESTINAL ENDOSCOPY  10/10    UPPER GASTROINTESTINAL ENDOSCOPY  11/18/2013    Dr Kaycee Bernard     Family History   Problem Relation Age of Onset    Breast Cancer Mother 76    Cancer Father     Colon Cancer Paternal Grandmother     Colon Polyps Paternal Grandmother      Social History     Tobacco Use    Smoking status: Former Smoker     Packs/day: 1.50     Years: 10.00     Pack years: 15.00     Types: Cigarettes     Quit date: 1995     Years since quittin.2    Smokeless tobacco: Never Used    Tobacco comment: quit over 30 yrs   Substance Use Topics    Alcohol use: Yes     Comment: once or twice a year       Old records obtained from the referring provider. These records have been reviewed and summarized. Review of Systems    Constitutional - no significant activity change, appetite change, or unexpected weight change. No fever or chills. No diaphoresis or significant fatigue. HENT - no significant rhinorrhea or epistaxis. No tinnitus or significant hearing loss. Eyes - no sudden vision change or amaurosis. Respiratory - no significant shortness of breath, wheezing, or stridor. No apnea, cough, or chest tightness associated with shortness of breath. Cardiovascular - no chest pain, syncope, or significant dizziness. No palpitations. She reports leg swelling. No claudication. Gastrointestinal - no abdominal swelling or pain. No blood in stool. No severe constipation, diarrhea, nausea, or vomiting. Genitourinary - No difficulty urinating, dysuria, frequency, or urgency. No flank pain or hematuria. Musculoskeletal - no back pain, gait disturbance, or myalgia. Skin - no color change, rash, pallor, or new wound. Neurologic - no dizziness, facial asymmetry, or light headedness. No seizures. No speech difficulty or lateralizing weakness. Hematologic - no easy bruising or excessive bleeding. Psychiatric - no severe anxiety or nervousness. No confusion. All other review of systems are negative. Physical Exam    Constitutional - well developed, well nourished. No diaphoresis or acute distress. HENT - head normocephalic.   Right external ear canal appears normal.  Left external ear canal appears normal.  Septum appears midline. Eyes - conjunctiva normal.  EOMS normal.  No exudate. No icterus. Neck- ROM appears normal, no tracheal deviation. Cardiovascular - Regular rate and rhythm. Heart sounds are normal.  No murmur, rub, or gallop. Carotid pulses are 2+ to palpation bilaterally without bruit. Extremities - Radial and brachial pulses are 2+ to palpation bilaterally. DP and PT pulses are palpable. No cyanosis or clubbing. No signs atheroembolic event. She has multiple varicosities noted mainly on her left lower extremity with some swelling noted she has erythema swelling and induration noted mainly in the left medial calf and thigh area over the varicosities. Less varicosities noted right lower extremity. No wounds noted at this time. Pulmonary - effort appears normal.  No respiratory distress. Lungs - Breath sounds normal. No wheezes or rales. GI - Abdomen - soft, non tender, bowel sounds X 4 quadrants. No guarding or rebound tenderness. No distension or palpable mass. Genitourinary - deferred. Musculoskeletal - ROM appears normal.  Neurologic - alert and oriented X 3. Physiologic. Skin - warm, dry, and intact. No rash, erythema, or pallor. Psychiatric - mood, affect, and behavior appear normal.  Judgment and thought processes appear normal.      Assessment      1. Varicose veins of left lower extremity with pain    2. Left leg pain    3. Swelling of limb          Plan      Support hose  20-30 mmHg compression daily. I explained to her that she could wear thigh-high compression stockings if that was more comfortable in relationship to where her painful varicosities were located  Recommend leg elevation above the level of the heart  Continue Coumadin as directed by PCP  Recommend moist heat for symptomatic relief of pain in the affected area  We will obtain a bilateral lower extremity venous scan for reflux.   After we have reviewed the imaging we will call her with results and further recommendations    Addendum:  Bilateral lower extremity venous scan for reflux -reviewed by Dr. Srinivasa Diaz        Right Side: The greater saphenous vein exhibits reflux lasting for a    maximum of 6598 milliseconds in the saphenofemoral junction. There is no    evidence of deep venous thrombosis in the segments insonated. There is no    deep vein reflux. There is 5039 seconds of short saphenous vein reflux,    mid calf.        Left Side: The greater saphenous vein exhibits reflux lasting for a    maximum of 6037 milliseconds in the saphenofemoral junction. There is no    evidence of deep venous thrombosis in the segments insonated. There is no    deep vein reflux. There is no short saphenous vein reflux.        There is no evidence of deep venous thrombosis (DVT) in the right lower    extremity.   Jose Lava is no evidence of superficial thrombophlebitis of the right lower    extremity.        There is evidence of chronic, partially occlusive deep vein thrombosis in    the left lower extremity involving the peroneal vein.    Chronic appearing superficial thrombophlebitis is seen in the great    saphenous vein of the left lower calf.        Signature        ----------------------------------------------------------------    Electronically signed by Char Saucedo MD(Interpreting    physician) on 03/01/2022 03:07 PM    ----------------------------------------------------------------       Velocities are measured in cm/s ; Diameters are measured in mm       Right Doppler Measurements   +----------------------------------------+------+------+-------------------+   ! Location                                !Signal!Reflux! Reflux (msec)      !   +----------------------------------------+------+------+-------------------+   ! Sapheno Femoral Junction                !      !      !8049               ! +----------------------------------------+------+------+-------------------+   ! GSV 2 cm Distal to Junction             !      !      !2376               !   +----------------------------------------+------+------+-------------------+   ! GSV Mid Thigh                           !      !      !9241               !   +----------------------------------------+------+------+-------------------+   ! GSV Knee                                !      !      !1190               !   +----------------------------------------+------+------+-------------------+   ! SSV High Calf                           !      !      !0                  !   +----------------------------------------+------+------+-------------------+   ! SSV Mid Calf                            !      !      !7143               !   +----------------------------------------+------+------+-------------------+   ! SSV Ankle                               !      !      !1279               !   +----------------------------------------+------+------+-------------------+       Left Doppler Measurements   +----------------------------------------+------+------+-------------------+   ! Location                                !Signal!Reflux! Reflux (msec)      !   +----------------------------------------+------+------+-------------------+   ! Sapheno Femoral Junction                !      !      !0262               !   +----------------------------------------+------+------+-------------------+   ! GSV 2 cm Distal to Junction             !      !      !0317               !   +----------------------------------------+------+------+-------------------+   ! GSV Mid Thigh                           !      !      !1761               !   +----------------------------------------+------+------+-------------------+   ! GSV Knee                                !      !      !4162               !   +----------------------------------------+------+------+-------------------+       Right Mapping +----------------------------------------------+----------+----------------+   ! Location                                      !AP Diam   ! Trans Diam      !   +----------------------------------------------+----------+----------------+   ! Sapheno Femoral Junction                      !12.1      !                !   +----------------------------------------------+----------+----------------+   ! GSV 2 cm Distal to Junction                   !          !6.3             !   +----------------------------------------------+----------+----------------+   ! GSV Mid Thigh                                 !          !5. 1             !   +----------------------------------------------+----------+----------------+   ! GSV Knee                                      !          !4. 3             !   +----------------------------------------------+----------+----------------+   ! SSV High Calf                                 !          !1. 5             !   +----------------------------------------------+----------+----------------+   ! SSV Mid Calf                                  !          !3. 4             !   +----------------------------------------------+----------+----------------+   ! SSV Ankle                                     !          !2. 3             !   +----------------------------------------------+----------+----------------+       Left Mapping   +----------------------------------------------+----------+----------------+   ! Location                                      !AP Diam   ! Trans Diam      !   +----------------------------------------------+----------+----------------+   ! Sapheno Femoral Junction                      !11.4      !                !   +----------------------------------------------+----------+----------------+   ! GSV 2 cm Distal to Junction                   !          !15.8            !   +----------------------------------------------+----------+----------------+   ! GSV Mid Thigh                                 !          !7.7             !   +----------------------------------------------+----------+----------------+   ! GSV Knee                                      !          !6.7             !   +----------------------------------------------+----------+----------------+   ! SSV High Calf                                 !          !3               !   +----------------------------------------------+----------+----------------+   ! SSV Mid Calf                                  !          !2. 5             !   +----------------------------------------------+----------+----------------+   ! SSV Ankle                                     !          !1. 9             !   +----------------------------------------------+----------+----------------+       Velocities are measured in cm/s ; Diameters are measured in mm       Right Lower Extremities DVT Study Measurements   Right 2D Measurements   +------------------------------------+----------+---------------+----------+   ! Location                            ! Visualized! Compressibility! Thrombosis! +------------------------------------+----------+---------------+----------+   ! Sapheno Femoral Junction            ! Yes       ! Yes            !None      !   +------------------------------------+----------+---------------+----------+   ! Common Femoral                      ! Yes       ! Yes            !None      !   +------------------------------------+----------+---------------+----------+   ! Prox Femoral                        ! Yes       ! Yes            !None      !   +------------------------------------+----------+---------------+----------+   ! Mid Femoral                         ! Yes       ! Yes            !None      !   +------------------------------------+----------+---------------+----------+   ! Dist Femoral                        ! Yes       ! Yes            !None      !   +------------------------------------+----------+---------------+----------+   ! Deep Femoral                        ! Yes       ! Yes            !None      !   +------------------------------------+----------+---------------+----------+   ! Popliteal                           ! Yes       ! Yes            !None      !   +------------------------------------+----------+---------------+----------+   ! SSV                                 ! Yes       ! Yes            !None      !   +------------------------------------+----------+---------------+----------+   ! Gastroc                             ! Yes       ! Yes            !None      !   +------------------------------------+----------+---------------+----------+   ! PTV                                 ! Yes       ! Yes            !None      !   +------------------------------------+----------+---------------+----------+   ! GSV                                 ! Yes       ! Yes            !None      !   +------------------------------------+----------+---------------+----------+   ! ATV                                 ! Yes       ! Yes            !None      !   +------------------------------------+----------+---------------+----------+   ! Peroneal                            ! Yes       ! Yes            !None      !   +------------------------------------+----------+---------------+----------+       Left Lower Extremities DVT Study Measurements   Left 2D Measurements   +------------------------------------+----------+---------------+----------+   ! Location                            ! Visualized! Compressibility! Thrombosis! +------------------------------------+----------+---------------+----------+   ! Sapheno Femoral Junction            ! Yes       ! Yes            !None      !   +------------------------------------+----------+---------------+----------+   ! Common Femoral                      ! Yes       ! Yes            !None      !   +------------------------------------+----------+---------------+----------+   ! Prox Femoral                        ! Yes       ! Yes            !None      ! +------------------------------------+----------+---------------+----------+   ! Mid Femoral                         ! Yes       ! Yes            !None      !   +------------------------------------+----------+---------------+----------+   ! Dist Femoral                        ! Yes       ! Yes            !None      !   +------------------------------------+----------+---------------+----------+   ! Deep Femoral                        ! Yes       ! Yes            !None      !   +------------------------------------+----------+---------------+----------+   ! Popliteal                           ! Yes       ! Yes            !None      !   +------------------------------------+----------+---------------+----------+   ! SSV                                 ! Yes       ! Yes            !None      !   +------------------------------------+----------+---------------+----------+   ! Gastroc                             ! Yes       ! Yes            !None      !   +------------------------------------+----------+---------------+----------+   ! PTV                                 ! Yes       ! Yes            !None      !   +------------------------------------+----------+---------------+----------+   ! GSV                                 ! Yes       !Partial        !          !   +------------------------------------+----------+---------------+----------+   ! ATV                                 ! Yes       ! Yes            !None      !   +------------------------------------+----------+---------------+----------+   ! Peroneal                            ! Yes       !Partial        ! Chronic   !   +------------------------------------+----------+---------------+----------+         Dr. Daniel Patel has reviewed the bilateral lower extremity venous scan for reflux.   He recommends proceeding with a radiofrequency ablation left greater saphenous vein since she has failed compression therapy and elevation  Options have been discussed with the patient including continued medical management versus proceeding with radiofrequency ablation left greater saphenous vein. Risks and benefits of the procedure were discussed with the patient including possibility of development of DVT, infection, nerve damage, bleeding, allergic reaction to anesthetic  She verbalizes understanding and wishes to proceed with procedure. We will submit for approval through her insurance company, if approved we will plan to proceed      Proceed with radiofrequency ablation left greater saphenous vein    Please note that parts of the chart were generated using Dragon dictation software. Although every effort was made to ensure the accuracy of this automated transcription, some errors in transcription may have occurred.

## 2022-04-06 ENCOUNTER — HOSPITAL ENCOUNTER (OUTPATIENT)
Dept: PHYSICAL THERAPY | Age: 67
Setting detail: THERAPIES SERIES
Discharge: HOME | End: 2022-04-06
Payer: MEDICARE

## 2022-04-06 PROCEDURE — 97110 THERAPEUTIC EXERCISES: CPT

## 2022-04-06 NOTE — PROGRESS NOTES
Physical Therapy  Daily Treatment Note/Reassessment/Discharge Note  Date: 2022  Patient Name: Santana Alexandre  MRN: [id number]     :   1955    Subjective:   General  Additional Pertinent Hx: 74year old female referred to PT with diagnosis of pain in left scapula. 21 MRI was summarized as negative MRI of the left scapula and periscapular soft tissues. Referring Practitioner: Cory Coughlin MD  PT Visit Information  PT Insurance Information: Medicare, [employer] [place]  Total # of Visits Approved: 15  Total # of Visits to Date: 15  Progress Note Due Date: 22  Subjective  Subjective: Patient reports that her strength in her arm has improved, but the pain in her left scapula remains and she continues to have intermittent numbness in her left arm. She has had injections in the past which have helped, but none have been scheduled recently. She has been issued a postural  and has been trying to wear it for 20-30 minutes.   Pain Screening  Patient Currently in Pain: Yes  Pain Assessment  Pain Assessment: 0-10  Vital Signs  Patient Currently in Pain: Yes       Treatment Activities:                                    Exercises  Exercise 1: overhead pulleys--5 min warmup  Exercise 2: finger walk with left hand--15  Exercise 3: left arm golfer's stretch--5 reps, 5 sec hold  Exercise 4: rhomboid stretch on post--5 reps, 10 second hold  Exercise 5: scapular retraction with green t-band--25 reps  Exercise 6: I's and T's green t-band x 20  Exercise 7: left shoulder ER/IR with green band--20 reps  Exercise 8: Paloff press  green t-band x 20  Exercise 9: lat pulls with Nautilus machine (small weight to start)  seated 35 pounds  x 20  Exercise 10: left arm forward bent row--10 reps with 5 pound weight  Exercise 11: alternating bicep curls with 3 pound weight x 20  Exercise 12: lower trap arm lifts  x 15  Exercise 13: standing trunk sidebending to left 3# x 10  Exercise 14: standing trunk rotation to left  x 15 with 5 sec hold using green band  Exercise 15: L-stretch with right arm overhead, left out to side  x 5 with 5 second hold  Exercise 16: Youngblood's pose, right arm on bow and left pulling strings red  x 10 with 5 second hold  Exercise 17: PNF diagonal using left arm (\"sword draw\")  x 15 with 5 second hold  Exercise 18: corner stretch--5 reps, 10 second hold  Exercise 19: left arm pect stretch--5 reps, 10 second hold  Exercise 20: supine lying on rolled towel or rigid pad x 3 minute  nicole today                               Assessment:   Conditions Requiring Skilled Therapeutic Intervention  Body structures, Functions, Activity limitations: Decreased ADL status; Decreased strength;Decreased high-level IADLs; Increased pain;Decreased posture  Assessment: Ms. Tere Basurto appears to have made progress since the initiation of PT as demonstrated by today's reassessment. She displays near full strength in her left shoulder, is able to reach forward and overhead with less problems. She has her home exercises and reports she is performing them and has been issued a postural  for use at home to reenforce proper upper body posture. She further reports she is avoiding wearing her purse on her left shoulder. Her original left scapular pain, however, has only diminished a small amount and overall not being afforded much relief from her therapy sessions. She mentions in the past that she had received injection in the past which had given her some relief and that she would consider pain management in the near future after talking to her doctors. By mutual consent, today was made her last session and she will continue to perform her home exercises to maintain/improve the strength in her shoulder. Ms. Tere Basurto will call me if she has questions regarding her program. Will discharge today.   Treatment Diagnosis: pain in left scapula (M25.512)  REQUIRES PT FOLLOW UP: No  Discharge Recommendations: Home independently Goals:  Short term goals  Time Frame for Short term goals: 3-4 weeks  Short term goal 1: Patient to be independent with HEP. --progress (4/6/22 Patient has HEP and has been performing them as time permits.)  Short term goal 2: Patient to report less left shoulder pain when first waking in morning. In progress,  Pain is present but decreasing.--progress (4/6/22 Patient reports that pain in morning still present in shoulder, maybe a little less intense.)  Short term goal 3: Patient to be routinely avoiding wearing purse on left shoulder. -- progress (4/6/22 Patient has been trying to avoid wearing purse on left shoulder but still does on occassion.)  Long term goals  Time Frame for Long term goals : 4-6 weeks  Long term goal 1: Patient to report less left scapular pain when reaching overhead. --progress (4/6/22 Patient reports some improvement in ability to reach overhead but still uncomfortable.)  Long term goal 2: Patient to have >=4+/5 for left shoulder flexors, ER's and elbow flexors. --met (4/6/22 Patient has 4+/5 left shoulder flexors, ER's and elbow flexors.)  Long term goal 3: Patient to score <= 5% impairment on the general use portion of the QuickDASH. --progress (3/7/22 6.8% impairment on the QuickDASH. 4/6/22 Patient scored 11.4% impairment on the general use portion of the QuickDASH.)  Plan:    Plan  Times per week: 2x per week  Plan weeks: 4-6 weeks  Current Treatment Recommendations: Strengthening,ROM,Home Exercise Program,Patient/Caregiver Education & Training,Modalities,Pain Management,Manual Therapy - Soft Tissue Mobilization  Plan Comment: Discharge from PT today.   Timed Code Treatment Minutes: 42 Minutes     Therapy Time   Individual Concurrent Group Co-treatment   Time In 5352         Time Out 0838         Minutes 42         Timed Code Treatment Minutes: 42 Minutes  Electronically signed by Jack Calderon PT on 4/6/2022 at 9:13 AM

## 2022-04-11 ENCOUNTER — APPOINTMENT (OUTPATIENT)
Dept: PHYSICAL THERAPY | Age: 67
End: 2022-04-11
Payer: MEDICARE

## 2022-04-13 ENCOUNTER — TELEPHONE (OUTPATIENT)
Dept: VASCULAR SURGERY | Age: 67
End: 2022-04-13

## 2022-04-13 NOTE — TELEPHONE ENCOUNTER
I sw the pt to let her know her arrival time for her procedure with Dr. Justyna Olguin tomorrow has changed to 0830. The pt voiced understanding and is aware of this change.

## 2022-04-14 ENCOUNTER — HOSPITAL ENCOUNTER (OUTPATIENT)
Dept: INTERVENTIONAL RADIOLOGY/VASCULAR | Age: 67
Discharge: HOME | End: 2022-04-14
Payer: MEDICARE

## 2022-04-14 VITALS
BODY MASS INDEX: 26.58 KG/M2 | OXYGEN SATURATION: 99 % | DIASTOLIC BLOOD PRESSURE: 78 MMHG | TEMPERATURE: 98.1 F | HEART RATE: 84 BPM | RESPIRATION RATE: 14 BRPM | HEIGHT: 63 IN | SYSTOLIC BLOOD PRESSURE: 145 MMHG | WEIGHT: 150 LBS

## 2022-04-14 DIAGNOSIS — I73.9 PERIPHERAL VASCULAR DISEASE (HCC): Primary | ICD-10-CM

## 2022-04-14 DIAGNOSIS — I73.9 PERIPHERAL VASCULAR DISEASE (HCC): ICD-10-CM

## 2022-04-14 DIAGNOSIS — I83.819 VARICOSE VEINS WITH PAIN: ICD-10-CM

## 2022-04-14 DIAGNOSIS — I73.9 PVD (PERIPHERAL VASCULAR DISEASE) (HCC): ICD-10-CM

## 2022-04-14 PROCEDURE — 2500000003 HC RX 250 WO HCPCS: Performed by: SURGERY

## 2022-04-14 PROCEDURE — 36475 ENDOVENOUS RF 1ST VEIN: CPT

## 2022-04-14 PROCEDURE — 99152 MOD SED SAME PHYS/QHP 5/>YRS: CPT

## 2022-04-14 PROCEDURE — 2709999900 IR ULTRASOUND GUIDANCE VASCULAR ACCESS

## 2022-04-14 PROCEDURE — 6360000002 HC RX W HCPCS: Performed by: SURGERY

## 2022-04-14 PROCEDURE — 99153 MOD SED SAME PHYS/QHP EA: CPT

## 2022-04-14 PROCEDURE — 2580000003 HC RX 258: Performed by: SURGERY

## 2022-04-14 PROCEDURE — 36475 ENDOVENOUS RF 1ST VEIN: CPT | Performed by: SURGERY

## 2022-04-14 PROCEDURE — 2580000003 HC RX 258: Performed by: PHYSICIAN ASSISTANT

## 2022-04-14 RX ORDER — ONDANSETRON 2 MG/ML
4 INJECTION INTRAMUSCULAR; INTRAVENOUS EVERY 8 HOURS PRN
Status: DISCONTINUED | OUTPATIENT
Start: 2022-04-14 | End: 2022-04-16 | Stop reason: HOSPADM

## 2022-04-14 RX ORDER — HYDROCODONE BITARTRATE AND ACETAMINOPHEN 5; 325 MG/1; MG/1
1 TABLET ORAL EVERY 8 HOURS PRN
Qty: 20 TABLET | Refills: 0 | Status: SHIPPED | OUTPATIENT
Start: 2022-04-14 | End: 2022-04-21

## 2022-04-14 RX ORDER — MIDAZOLAM HYDROCHLORIDE 1 MG/ML
INJECTION INTRAMUSCULAR; INTRAVENOUS
Status: COMPLETED | OUTPATIENT
Start: 2022-04-14 | End: 2022-04-14

## 2022-04-14 RX ORDER — FENTANYL CITRATE 50 UG/ML
INJECTION, SOLUTION INTRAMUSCULAR; INTRAVENOUS
Status: COMPLETED | OUTPATIENT
Start: 2022-04-14 | End: 2022-04-14

## 2022-04-14 RX ORDER — HYDROCODONE BITARTRATE AND ACETAMINOPHEN 5; 325 MG/1; MG/1
1 TABLET ORAL EVERY 4 HOURS PRN
Status: DISCONTINUED | OUTPATIENT
Start: 2022-04-14 | End: 2022-04-16 | Stop reason: HOSPADM

## 2022-04-14 RX ORDER — SODIUM CHLORIDE 9 MG/ML
INJECTION, SOLUTION INTRAVENOUS CONTINUOUS
Status: DISCONTINUED | OUTPATIENT
Start: 2022-04-14 | End: 2022-04-16 | Stop reason: HOSPADM

## 2022-04-14 RX ORDER — ACETAMINOPHEN 325 MG/1
650 TABLET ORAL EVERY 4 HOURS PRN
Status: DISCONTINUED | OUTPATIENT
Start: 2022-04-14 | End: 2022-04-16 | Stop reason: HOSPADM

## 2022-04-14 RX ORDER — HYDROCODONE BITARTRATE AND ACETAMINOPHEN 5; 325 MG/1; MG/1
2 TABLET ORAL EVERY 4 HOURS PRN
Status: DISCONTINUED | OUTPATIENT
Start: 2022-04-14 | End: 2022-04-16 | Stop reason: HOSPADM

## 2022-04-14 RX ORDER — SODIUM CHLORIDE 0.9 % (FLUSH) 0.9 %
10 SYRINGE (ML) INJECTION PRN
Status: DISCONTINUED | OUTPATIENT
Start: 2022-04-14 | End: 2022-04-16 | Stop reason: HOSPADM

## 2022-04-14 RX ADMIN — MIDAZOLAM 0.5 MG: 1 INJECTION INTRAMUSCULAR; INTRAVENOUS at 10:46

## 2022-04-14 RX ADMIN — FENTANYL CITRATE 25 MCG: 50 INJECTION INTRAMUSCULAR; INTRAVENOUS at 10:46

## 2022-04-14 RX ADMIN — FENTANYL CITRATE 25 MCG: 50 INJECTION INTRAMUSCULAR; INTRAVENOUS at 10:55

## 2022-04-14 RX ADMIN — MIDAZOLAM 0.5 MG: 1 INJECTION INTRAMUSCULAR; INTRAVENOUS at 10:55

## 2022-04-14 RX ADMIN — LIDOCAINE HYDROCHLORIDE: 20 INJECTION, SOLUTION INFILTRATION; PERINEURAL at 11:04

## 2022-04-14 RX ADMIN — SODIUM CHLORIDE: 9 INJECTION, SOLUTION INTRAVENOUS at 09:08

## 2022-04-14 NOTE — PROGRESS NOTES
Returned post ablation to left lower leg. Awake and alert. Ace wrap in place to left leg. Denies any c/o pain.

## 2022-04-14 NOTE — OP NOTE
Preprocedure Diagnosis:    1. Reflux and insufficiency of the left long saphenous vein  2. Varicose veins of left lower extremity with pain/inflammation  3. Phlebitis and thrombophlebitis of varicosities left lower extremity     Postprocedure Diagnosis:  Same    Procedure:  1. Ultrasound guided cannulation of left long saphenous vein  2.  Radiofrequency ablation of left long saphenous vein     ANESTHETIC:    1.  1% lidocaine without epinephrine for placing sheath   2. Tumescent anesthetic solution along the saphenous vein from sheath insertion site to the origin of saphenous vein radiofrequency catheter and saphenofemoral junction    Surgeon:  Karl Campbell. Devendra Leblanc MD    Estimated Blood Loss:  Less than 50 ml    Procedure in detail:      After the risks, benefits, and alternatives of the procedure were explained to the patient, including how the procedure would be performed, the patient signed an informed consent. The patient's leg was prepped and draped in the standard surgical fashion. Approximately 3-4 mL of 1% lidocaine was injected into the skin and subcutaneous tissues over the saphenous vein insertion site. The left Greater saphenous vein was then cannulated utilizing ultrasound guidance with a micropuncture needle and .018\" wire was placed through the needle. The needle was removed and replaced with a 7F sheath. The radiofrequency catheter was placed through the 7f sheath and under ultrasound guidance, the tip was directed to 2 cm from the saphenofemoral junction. Next, under ultrasound guidance, tumescent anesthetic solution was injected around the saphenous vein to create a halo of anesthesia along the entire vein from the sheath insertion site to the saphenofemoral junction. The tip of the catheter was again visualized to ensure position remains 2 cm from the saphenofemoral junction.     The radiofrequency ablation then commensed while gentle pressure on the ultrasound probe/saphenous vein was applied. A total of 8 treatment cycles was then performed. The sheath and catheter were removed and both were inspected and intact. Pressure was applied at exit site for 10 minutes. Sterile dressings and ace wrap were applied. The patient was released in stable condition and discharge instructions were given to the patient. They will follow-up in 72 hours for left lower extremity venous duplex and office visit.

## 2022-04-14 NOTE — PROGRESS NOTES
Patient instructed on care of left leg post vein ablation. Given written instructions. Patient stated understanding.

## 2022-04-18 ENCOUNTER — HOSPITAL ENCOUNTER (OUTPATIENT)
Dept: VASCULAR LAB | Age: 67
Discharge: HOME | End: 2022-04-18
Payer: MEDICARE

## 2022-04-18 ENCOUNTER — OFFICE VISIT (OUTPATIENT)
Dept: VASCULAR SURGERY | Age: 67
End: 2022-04-18
Payer: MEDICARE

## 2022-04-18 VITALS — SYSTOLIC BLOOD PRESSURE: 134 MMHG | DIASTOLIC BLOOD PRESSURE: 76 MMHG | HEART RATE: 98 BPM | TEMPERATURE: 98.4 F

## 2022-04-18 DIAGNOSIS — M79.89 SWELLING OF LIMB: ICD-10-CM

## 2022-04-18 DIAGNOSIS — M79.605 LEFT LEG PAIN: ICD-10-CM

## 2022-04-18 DIAGNOSIS — I83.812 VARICOSE VEINS OF LEFT LOWER EXTREMITY WITH PAIN: Primary | ICD-10-CM

## 2022-04-18 LAB
INR BLD: 2 (ref 0.88–1.18)
PROTHROMBIN TIME: 23.1 SEC (ref 12–14.6)

## 2022-04-18 PROCEDURE — 1090F PRES/ABSN URINE INCON ASSESS: CPT | Performed by: PHYSICIAN ASSISTANT

## 2022-04-18 PROCEDURE — 1123F ACP DISCUSS/DSCN MKR DOCD: CPT | Performed by: PHYSICIAN ASSISTANT

## 2022-04-18 PROCEDURE — 1036F TOBACCO NON-USER: CPT | Performed by: PHYSICIAN ASSISTANT

## 2022-04-18 PROCEDURE — G8399 PT W/DXA RESULTS DOCUMENT: HCPCS | Performed by: PHYSICIAN ASSISTANT

## 2022-04-18 PROCEDURE — 99212 OFFICE O/P EST SF 10 MIN: CPT | Performed by: PHYSICIAN ASSISTANT

## 2022-04-18 PROCEDURE — G8427 DOCREV CUR MEDS BY ELIG CLIN: HCPCS | Performed by: PHYSICIAN ASSISTANT

## 2022-04-18 PROCEDURE — 4040F PNEUMOC VAC/ADMIN/RCVD: CPT | Performed by: PHYSICIAN ASSISTANT

## 2022-04-18 PROCEDURE — 3017F COLORECTAL CA SCREEN DOC REV: CPT | Performed by: PHYSICIAN ASSISTANT

## 2022-04-18 PROCEDURE — 93971 EXTREMITY STUDY: CPT

## 2022-04-18 PROCEDURE — G8417 CALC BMI ABV UP PARAM F/U: HCPCS | Performed by: PHYSICIAN ASSISTANT

## 2022-04-18 NOTE — PROGRESS NOTES
Patient Care Team:  Nevin Haji DO as PCP - General (Family Medicine)  Nevin Haji DO as PCP - [facility]  Jennifer Morel DO as Consulting Physician (Gastroenterology)    Mrs. Zuhair Lynch is a 67-year-old female has a history that includes factor V Leiden on long-term anticoagulation, GERD, hypertension, past tobacco abuse and PE.   Mrs. Zuhair Lynch is following up today s/p left saphenous vein radiofrequency shin done on 4/14/2022 by Dr. Corona Melendez. She reports some tenderness in her left medial thigh area. She also reports some pain in her left ankle. She reports that she thinks her left leg is less tired since her procedure. Currently she is on Coumadin. Imelda Morales is a 74 y.o. female with the following history reviewed and recorded in [facility]:  Patient Active Problem List    Diagnosis Date Noted    Peripheral vascular disease ([facility] 04/14/2022    Varicose veins with pain 04/14/2022    DDD (degenerative disc disease), lumbar     Cancer ([facility]     HTN (hypertension)     Trigeminal neuralgia     Factor V Leiden ([facility]     Galaviz's esophagus without dysplasia 10/22/2013    Family hx of colon cancer 10/22/2013    Gastroesophageal reflux disease without esophagitis 10/22/2013     Current Outpatient Medications   Medication Sig Dispense Refill    HYDROcodone-acetaminophen (NORCO) 5-325 MG per tablet Take 1 tablet by mouth every 8 hours as needed for Pain for up to 7 days. Intended supply: 5 days. Take lowest dose possible to manage pain 20 tablet 0    HYDROcodone-acetaminophen (NORCO) 5-325 MG per tablet Take 1 tablet by mouth every 8 hours as needed for Pain.       alendronate (FOSAMAX) 70 MG tablet Take 70 mg by mouth every 7 days      pravastatin (PRAVACHOL) 10 MG tablet Take 10 mg by mouth daily (Patient not taking: Reported on 2/23/2022)      topiramate (TOPAMAX) 25 MG tablet Take 25 mg by mouth 2 times daily (Patient not taking: Reported on 2/23/2022)      diclofenac sodium (VOLTAREN) 1 % GEL Apply topically 2 times daily       vitamin B-12 (CYANOCOBALAMIN) 1000 MCG tablet Take 5,000 mcg by mouth daily      warfarin (COUMADIN) 6 MG tablet Take 7 mg by mouth daily       NIFEdipine (ADALAT CC) 30 MG CR tablet Take 60 mg by mouth daily       Calcium Carbonate-Vitamin D (CALCIUM + D) 600-200 MG-UNIT TABS Take 1 tablet by mouth 2 times daily.  Glucosamine-Chondroit-Vit C-Mn (GLUCOSAMINE CHONDR 1500 COMPLX PO) Take 1 tablet by mouth daily. (Patient not taking: Reported on 2/23/2022)      cyclobenzaprine (FLEXERIL) 5 MG tablet Take 5 mg by mouth 3 times daily as needed         No current facility-administered medications for this visit.      Allergies: Codeine  Past Medical History:   Diagnosis Date    Cancer ([facility]     skin cancer    Cerebral artery occlusion with cerebral infarction ([facility]     DDD (degenerative disc disease), lumbar     Factor V Leiden ([facility]     GERD (gastroesophageal reflux disease)     History of skin cancer     HTN (hypertension)     Hx of blood clots     Hyperlipidemia     Neuritis     Tinnitus     Trigeminal neuralgia        Past Surgical History:   Procedure Laterality Date    CARPAL TUNNEL RELEASE      right    CERVICAL POLYP REMOVAL      COLONOSCOPY  12/08    Dr Sarahi Petty  11/18/2013    Dr. Jana Baldwin      corrective    JOINT REPLACEMENT Right     knee    [place] EGD TRANSORAL BIOPSY SINGLE/MULTIPLE N/A 12/27/2016    Dr Eve Zuniga-(-) Galaviz's, 3 yr recall     UPPER GASTROINTESTINAL ENDOSCOPY  10/10    UPPER GASTROINTESTINAL ENDOSCOPY  11/18/2013    Dr Eve Zuniga     Family History   Problem Relation Age of Onset    Breast Cancer Mother 76    Cancer Father     Colon Cancer Paternal Grandmother     Colon Polyps Paternal Grandmother      Social History     Tobacco Use    Smoking status: Former Smoker     Packs/day: 1.50     Years: 10.00     Pack years: 15.00     Types: Cigarettes     Quit date: 1/1/1995 Years since quittin.3    Smokeless tobacco: Never Used    Tobacco comment: quit over 30 yrs   Substance Use Topics    Alcohol use: Yes     Comment: once or twice a year       Review of Systems    Constitutional  no significant activity change, appetite change, or unexpected weight change. No fever or chills. No diaphoresis or significant fatigue. HENT  no significant rhinorrhea or epistaxis. No tinnitus or significant hearing loss. Eyes  no sudden vision change or amaurosis. Respiratory  no significant shortness of breath, wheezing, or stridor. No apnea, cough, or chest tightness associated with shortness of breath. Cardiovascular  no chest pain, syncope, or significant dizziness. No palpitations or significant leg swelling.  (see HPI)  Gastrointestinal  no abdominal swelling or pain. No blood in stool. No severe constipation, diarrhea, nausea, or vomiting. Genitourinary  No difficulty urinating, dysuria, frequency, or urgency. No flank pain or hematuria. Musculoskeletal  no back pain, gait disturbance, or myalgia. Skin  no color change, rash, pallor, or new wound. Neurologic  no dizziness, facial asymmetry, or light headedness. No seizures. No speech difficulty or lateralizing weakness. Hematologic  no easy bruising or excessive bleeding. Psychiatric  no severe anxiety or nervousness. No confusion. All other review of systems are negative. Physical Exam    /76 (Site: Right Upper Arm, Position: Sitting, Cuff Size: Medium Adult)   Pulse 98   Temp 98.4 °F (36.9 °C)     Constitutional  well developed, well nourished. No diaphoresis or acute distress. HENT  head normocephalic. Right external ear canal appears normal.  Left external ear canal appears normal.  Septum appears midline. Eyes  conjunctiva normal.  EOMS normal.  No exudate. No icterus. Neck- ROM appears normal, no tracheal deviation. Cardiovascular  Regular rate and rhythm.   Heart sounds are normal.  No murmur, rub, or gallop. Carotid pulses are 2+ to palpation bilaterally without bruit. Extremities -left lower extremity puncture site healed. No significant bruising or erythema noted. No redness to suggest infectious process. She has multiple large varicosities noted mainly on her left lower extremity less on her right lower extremity. Pulmonary  effort appears normal.  No respiratory distress. Lungs - Breath sounds normal. No wheezes or rales. GI - Abdomen  soft, non tender, bowel sounds X 4 quadrants. No guarding or rebound tenderness. No distension or palpable mass. Genitourinary  deferred. Musculoskeletal  ROM appears normal.  No significant edema. Neurologic  alert and oriented X 3. Physiologic. Skin  warm, dry, and intact. No rash, erythema, or pallor. Psychiatric  mood, affect, and behavior appear normal.  Judgment and thought processes appear normal.    Left leg venous scan shows no DVT and ablation of the left Greater vein      Assessment      1. Varicose veins of left lower extremity with pain    2.  Left leg pain          Plan      Recommend she continue to wear support hose  20-30 mmHg compression daily  Nsaid's and moist heat for pain control PRN  Leg elevation PRN  We will follow-up as needed

## 2022-06-02 ENCOUNTER — HOSPITAL ENCOUNTER (OUTPATIENT)
Dept: GENERAL RADIOLOGY | Age: 67
Discharge: HOME | End: 2022-06-02
Payer: MEDICARE

## 2022-06-02 DIAGNOSIS — M54.2 CERVICALGIA: ICD-10-CM

## 2022-06-02 DIAGNOSIS — M54.50 LOW BACK PAIN, UNSPECIFIED BACK PAIN LATERALITY, UNSPECIFIED CHRONICITY, UNSPECIFIED WHETHER SCIATICA PRESENT: ICD-10-CM

## 2022-06-02 PROCEDURE — 72100 X-RAY EXAM L-S SPINE 2/3 VWS: CPT | Performed by: RADIOLOGY

## 2022-06-02 PROCEDURE — 72100 X-RAY EXAM L-S SPINE 2/3 VWS: CPT

## 2022-06-02 PROCEDURE — 72040 X-RAY EXAM NECK SPINE 2-3 VW: CPT | Performed by: RADIOLOGY

## 2022-06-02 PROCEDURE — 72040 X-RAY EXAM NECK SPINE 2-3 VW: CPT

## 2022-08-05 LAB
INR BLD: 1.23 (ref 0.88–1.18)
PROTHROMBIN TIME: 15.5 SEC (ref 12–14.6)

## 2022-08-08 LAB
INR BLD: 1.77 (ref 0.88–1.18)
PROTHROMBIN TIME: 20.9 SEC (ref 12–14.6)

## 2022-08-10 LAB
INR BLD: 2.38 (ref 0.88–1.18)
PROTHROMBIN TIME: 26.7 SEC (ref 12–14.6)

## 2023-01-20 ENCOUNTER — HOSPITAL ENCOUNTER (OUTPATIENT)
Dept: GENERAL RADIOLOGY | Age: 68
Discharge: HOME | End: 2023-01-20
Payer: MEDICARE

## 2023-01-20 DIAGNOSIS — G50.1 ATYPICAL FACE PAIN: ICD-10-CM

## 2023-01-20 DIAGNOSIS — M25.552 LEFT HIP PAIN: ICD-10-CM

## 2023-01-20 PROCEDURE — 73502 X-RAY EXAM HIP UNI 2-3 VIEWS: CPT

## 2023-01-20 PROCEDURE — 70220 X-RAY EXAM OF SINUSES: CPT

## 2023-01-23 ENCOUNTER — TRANSCRIBE ORDERS (OUTPATIENT)
Dept: ADMINISTRATIVE | Facility: HOSPITAL | Age: 68
End: 2023-01-23
Payer: MEDICARE

## 2023-01-23 DIAGNOSIS — D68.9 BLOOD CLOTTING DISORDER: ICD-10-CM

## 2023-01-23 DIAGNOSIS — M79.605 LEFT LEG PAIN: Primary | ICD-10-CM

## 2023-01-23 LAB — APTT: 38.2 SEC (ref 26–36.2)

## 2023-01-24 ENCOUNTER — HOSPITAL ENCOUNTER (OUTPATIENT)
Dept: ULTRASOUND IMAGING | Facility: HOSPITAL | Age: 68
Discharge: HOME | End: 2023-01-24
Admitting: PHYSICIAN ASSISTANT
Payer: MEDICARE

## 2023-01-24 DIAGNOSIS — M79.605 LEFT LEG PAIN: ICD-10-CM

## 2023-01-24 PROCEDURE — 93971 EXTREMITY STUDY: CPT

## 2023-01-24 PROCEDURE — 93971 EXTREMITY STUDY: CPT | Performed by: SURGERY

## 2023-01-25 LAB
INR BLD: 2.87 (ref 0.88–1.18)
PROTHROMBIN TIME: 31.1 SEC (ref 12–14.6)

## 2023-01-27 ENCOUNTER — HOSPITAL ENCOUNTER (OUTPATIENT)
Dept: NON INVASIVE DIAGNOSTICS | Age: 68
Discharge: HOME | End: 2023-01-27
Payer: MEDICARE

## 2023-01-27 DIAGNOSIS — R01.1 HEART MURMUR: ICD-10-CM

## 2023-01-27 LAB
LV EF: 58 %
LVEF MODALITY: NORMAL

## 2023-01-27 PROCEDURE — 93306 TTE W/DOPPLER COMPLETE: CPT

## 2023-03-28 ENCOUNTER — HOSPITAL ENCOUNTER (OUTPATIENT)
Dept: WOMENS IMAGING | Age: 68
Discharge: HOME | End: 2023-03-28
Payer: MEDICARE

## 2023-03-28 VITALS — WEIGHT: 142 LBS | BODY MASS INDEX: 25.15 KG/M2

## 2023-03-28 DIAGNOSIS — Z12.31 ENCOUNTER FOR SCREENING MAMMOGRAM FOR MALIGNANT NEOPLASM OF BREAST: ICD-10-CM

## 2023-03-28 PROCEDURE — 77067 SCR MAMMO BI INCL CAD: CPT | Performed by: RADIOLOGY

## 2023-03-28 PROCEDURE — 77063 BREAST TOMOSYNTHESIS BI: CPT

## 2024-02-05 ENCOUNTER — TRANSCRIBE ORDERS (OUTPATIENT)
Dept: ADMINISTRATIVE | Facility: HOSPITAL | Age: 69
End: 2024-02-05
Payer: MEDICARE

## 2024-02-05 ENCOUNTER — HOSPITAL ENCOUNTER (OUTPATIENT)
Dept: CT IMAGING | Facility: HOSPITAL | Age: 69
Discharge: HOME | End: 2024-02-05
Admitting: FAMILY MEDICINE
Payer: MEDICARE

## 2024-02-05 DIAGNOSIS — S09.90XA INJURY OF HEAD REGION, INITIAL ENCOUNTER: ICD-10-CM

## 2024-02-05 DIAGNOSIS — R51.9 HEADACHE, UNSPECIFIED HEADACHE TYPE: ICD-10-CM

## 2024-02-05 DIAGNOSIS — Z79.01 LONG TERM (CURRENT) USE OF ANTICOAGULANTS: ICD-10-CM

## 2024-02-05 DIAGNOSIS — S09.90XA INJURY OF HEAD REGION, INITIAL ENCOUNTER: Primary | ICD-10-CM

## 2024-02-05 PROCEDURE — 70450 CT HEAD/BRAIN W/O DYE: CPT

## 2024-03-29 ENCOUNTER — HOSPITAL ENCOUNTER (OUTPATIENT)
Dept: WOMENS IMAGING | Age: 69
Discharge: HOME | End: 2024-03-29
Payer: MEDICARE

## 2024-03-29 VITALS — HEIGHT: 63 IN | BODY MASS INDEX: 24.8 KG/M2 | WEIGHT: 140 LBS

## 2024-03-29 DIAGNOSIS — Z12.31 SCREENING MAMMOGRAM FOR BREAST CANCER: ICD-10-CM

## 2024-03-29 PROCEDURE — 77063 BREAST TOMOSYNTHESIS BI: CPT

## 2024-12-18 ENCOUNTER — HOSPITAL ENCOUNTER (OUTPATIENT)
Dept: GENERAL RADIOLOGY | Age: 69
Discharge: HOME | End: 2024-12-18
Payer: MEDICARE

## 2024-12-18 DIAGNOSIS — M25.552 LEFT HIP PAIN: ICD-10-CM

## 2024-12-18 PROCEDURE — 73502 X-RAY EXAM HIP UNI 2-3 VIEWS: CPT

## 2025-01-13 ENCOUNTER — OFFICE VISIT (OUTPATIENT)
Age: 70
End: 2025-01-13

## 2025-01-13 ENCOUNTER — HOSPITAL ENCOUNTER (OUTPATIENT)
Age: 70
Discharge: HOME | End: 2025-01-15
Payer: MEDICARE

## 2025-01-13 VITALS — BODY MASS INDEX: 24.8 KG/M2 | WEIGHT: 140 LBS | HEIGHT: 63 IN

## 2025-01-13 VITALS
SYSTOLIC BLOOD PRESSURE: 134 MMHG | DIASTOLIC BLOOD PRESSURE: 76 MMHG | HEIGHT: 63 IN | WEIGHT: 140 LBS | BODY MASS INDEX: 24.8 KG/M2

## 2025-01-13 DIAGNOSIS — M17.12 PRIMARY OSTEOARTHRITIS OF LEFT KNEE: ICD-10-CM

## 2025-01-13 DIAGNOSIS — I35.0 NONRHEUMATIC AORTIC VALVE STENOSIS: ICD-10-CM

## 2025-01-13 DIAGNOSIS — M25.562 LEFT KNEE PAIN, UNSPECIFIED CHRONICITY: Primary | ICD-10-CM

## 2025-01-13 DIAGNOSIS — M51.370 DEGENERATION OF INTERVERTEBRAL DISC OF LUMBOSACRAL REGION WITH DISCOGENIC BACK PAIN: ICD-10-CM

## 2025-01-13 LAB
ECHO AO ASC DIAM: 2.7 CM
ECHO AO ASCENDING AORTA INDEX: 1.63 CM/M2
ECHO AO ROOT DIAM: 1.1 CM
ECHO AO ROOT INDEX: 0.66 CM/M2
ECHO AO SINUS VALSALVA DIAM: 2.5 CM
ECHO AO SINUS VALSALVA INDEX: 1.51 CM/M2
ECHO AO ST JNCT DIAM: 1.9 CM
ECHO AV AREA PEAK VELOCITY: 1.2 CM2
ECHO AV AREA VTI: 1.4 CM2
ECHO AV AREA/BSA PEAK VELOCITY: 0.7 CM2/M2
ECHO AV AREA/BSA VTI: 0.8 CM2/M2
ECHO AV MEAN GRADIENT: 18 MMHG
ECHO AV MEAN VELOCITY: 2 M/S
ECHO AV PEAK GRADIENT: 35 MMHG
ECHO AV PEAK VELOCITY: 2.9 M/S
ECHO AV VTI: 69 CM
ECHO BSA: 1.68 M2
ECHO EST RA PRESSURE: 3 MMHG
ECHO IVC PROX: 1 CM
ECHO LA AREA 2C: 10.7 CM2
ECHO LA AREA 4C: 10.9 CM2
ECHO LA DIAMETER INDEX: 2.05 CM/M2
ECHO LA DIAMETER: 3.4 CM
ECHO LA MAJOR AXIS: 3.8 CM
ECHO LA MINOR AXIS: 3.7 CM
ECHO LA TO AORTIC ROOT RATIO: 3.09
ECHO LA VOL BP: 25 ML (ref 22–52)
ECHO LA VOL MOD A2C: 25 ML (ref 22–52)
ECHO LA VOL MOD A4C: 24 ML (ref 22–52)
ECHO LA VOL/BSA BIPLANE: 15 ML/M2 (ref 16–34)
ECHO LA VOLUME INDEX MOD A2C: 15 ML/M2 (ref 16–34)
ECHO LA VOLUME INDEX MOD A4C: 14 ML/M2 (ref 16–34)
ECHO LV E' LATERAL VELOCITY: 10.7 CM/S
ECHO LV E' SEPTAL VELOCITY: 10.2 CM/S
ECHO LV EDV A2C: 71 ML
ECHO LV EDV A4C: 69 ML
ECHO LV EDV INDEX A4C: 42 ML/M2
ECHO LV EDV NDEX A2C: 43 ML/M2
ECHO LV EJECTION FRACTION A2C: 71 %
ECHO LV EJECTION FRACTION A4C: 54 %
ECHO LV EJECTION FRACTION BIPLANE: 64 % (ref 55–100)
ECHO LV ESV A2C: 21 ML
ECHO LV ESV A4C: 32 ML
ECHO LV ESV INDEX A2C: 13 ML/M2
ECHO LV ESV INDEX A4C: 19 ML/M2
ECHO LV FRACTIONAL SHORTENING: 37 % (ref 28–44)
ECHO LV INTERNAL DIMENSION DIASTOLE INDEX: 2.47 CM/M2
ECHO LV INTERNAL DIMENSION DIASTOLIC: 4.1 CM (ref 3.9–5.3)
ECHO LV INTERNAL DIMENSION SYSTOLIC INDEX: 1.57 CM/M2
ECHO LV INTERNAL DIMENSION SYSTOLIC: 2.6 CM
ECHO LV IVSD: 1 CM (ref 0.6–0.9)
ECHO LV MASS 2D: 123 G (ref 67–162)
ECHO LV MASS INDEX 2D: 74.1 G/M2 (ref 43–95)
ECHO LV POSTERIOR WALL DIASTOLIC: 0.9 CM (ref 0.6–0.9)
ECHO LV RELATIVE WALL THICKNESS RATIO: 0.44
ECHO LVOT AREA: 3.1 CM2
ECHO LVOT AV VTI INDEX: 0.44
ECHO LVOT DIAM: 2 CM
ECHO LVOT MEAN GRADIENT: 3 MMHG
ECHO LVOT PEAK GRADIENT: 5 MMHG
ECHO LVOT PEAK VELOCITY: 1.1 M/S
ECHO LVOT PEAK VELOCITY: 1.1 M/S
ECHO LVOT STROKE VOLUME INDEX: 56.9 ML/M2
ECHO LVOT SV: 94.5 ML
ECHO LVOT VTI: 30.1 CM
ECHO MV A VELOCITY: 1.02 M/S
ECHO MV AREA VTI: 3.8 CM2
ECHO MV E DECELERATION TIME (DT): 224 MS
ECHO MV E VELOCITY: 1.17 M/S
ECHO MV E/A RATIO: 1.15
ECHO MV E/E' LATERAL: 10.93
ECHO MV E/E' RATIO (AVERAGED): 11.2
ECHO MV E/E' SEPTAL: 11.47
ECHO MV LVOT VTI INDEX: 0.84
ECHO MV MAX VELOCITY: 1.2 M/S
ECHO MV MEAN GRADIENT: 2 MMHG
ECHO MV MEAN VELOCITY: 0.7 M/S
ECHO MV PEAK GRADIENT: 5 MMHG
ECHO MV REGURGITANT PEAK GRADIENT: 71 MMHG
ECHO MV REGURGITANT PEAK VELOCITY: 4.2 M/S
ECHO MV REGURGITANT VTIA: 143 CM
ECHO MV VTI: 25.2 CM
ECHO RA AREA 4C: 8 CM2
ECHO RA END SYSTOLIC VOLUME APICAL 4 CHAMBER INDEX BSA: 9 ML/M2
ECHO RA VOLUME: 15 ML
ECHO RIGHT VENTRICULAR SYSTOLIC PRESSURE (RVSP): 24 MMHG
ECHO RV BASAL DIMENSION: 3.3 CM
ECHO RV INTERNAL DIMENSION: 2.2 CM
ECHO RV LONGITUDINAL DIMENSION: 6.1 CM
ECHO RV MID DIMENSION: 3 CM
ECHO RV TAPSE: 1.7 CM (ref 1.7–?)
ECHO TV REGURGITANT MAX VELOCITY: 2.27 M/S
ECHO TV REGURGITANT PEAK GRADIENT: 21 MMHG

## 2025-01-13 PROCEDURE — 6360000004 HC RX CONTRAST MEDICATION: Performed by: FAMILY MEDICINE

## 2025-01-13 PROCEDURE — 93306 TTE W/DOPPLER COMPLETE: CPT | Performed by: INTERNAL MEDICINE

## 2025-01-13 PROCEDURE — C8929 TTE W OR WO FOL WCON,DOPPLER: HCPCS

## 2025-01-13 RX ORDER — LIDOCAINE HYDROCHLORIDE 10 MG/ML
6 INJECTION, SOLUTION INFILTRATION; PERINEURAL ONCE
Status: COMPLETED | OUTPATIENT
Start: 2025-01-13 | End: 2025-01-13

## 2025-01-13 RX ADMIN — SULFUR HEXAFLUORIDE 2 ML: 60.7; .19; .19 INJECTION, POWDER, LYOPHILIZED, FOR SUSPENSION INTRAVENOUS; INTRAVESICAL at 08:57

## 2025-01-13 RX ADMIN — LIDOCAINE HYDROCHLORIDE 6 ML: 10 INJECTION, SOLUTION INFILTRATION; PERINEURAL at 11:28

## 2025-01-13 NOTE — PROGRESS NOTES
[facility]  [facility]  [address]  [facility] [id number] [phone]         Catrina Cheung (: 1955) is a 69 y.o. female, patient, here for evaluation of the following chief complaint(s): Knee Pain (Left )  .         Patient's PCP: Alejandra Pop DO     Patient's Last Appointment in this Department was on Visit date not found      Subjective:     Chief Complaint   Patient presents with    Knee Pain     Left         Patient presents in follow-up 69-year-old female had a gel injection of her left knee about 6 months ago it helped a lot.  She has been recently she has had a little discomfort walking on concrete.  To grind a little bit.  The pain is over the anterolateral aspect of her knee.  She also been having some left buttock pain.  Had x-rays of her hip back in December.            Medications  Current Outpatient Medications   Medication Sig Dispense Refill    HYDROcodone-acetaminophen (NORCO) 5-325 MG per tablet Take 1 tablet by mouth every 8 hours as needed for Pain.      alendronate (FOSAMAX) 70 MG tablet Take 1 tablet by mouth every 7 days      pravastatin (PRAVACHOL) 10 MG tablet Take 1 tablet by mouth daily      topiramate (TOPAMAX) 25 MG tablet Take 1 tablet by mouth 2 times daily      diclofenac sodium (VOLTAREN) 1 % GEL Apply topically 2 times daily       vitamin B-12 (CYANOCOBALAMIN) 1000 MCG tablet Take 5 tablets by mouth daily      warfarin (COUMADIN) 6 MG tablet Take 7 mg by mouth daily       NIFEdipine (ADALAT CC) 30 MG CR tablet Take 2 tablets by mouth daily      Calcium Carbonate-Vitamin D (CALCIUM + D) 600-200 MG-UNIT TABS Take 1 tablet by mouth 2 times daily.        Glucosamine-Chondroit-Vit C-Mn (GLUCOSAMINE CHONDR 1500 COMPLX PO) Take 1 tablet by mouth daily      cyclobenzaprine (FLEXERIL) 5 MG tablet Take 1 tablet by mouth 3 times daily as needed       No current facility-administered medications

## 2025-03-31 ENCOUNTER — HOSPITAL ENCOUNTER (OUTPATIENT)
Dept: WOMENS IMAGING | Age: 70
Discharge: HOME | End: 2025-03-31
Payer: MEDICARE

## 2025-03-31 VITALS — BODY MASS INDEX: 25.15 KG/M2 | WEIGHT: 142 LBS

## 2025-03-31 DIAGNOSIS — Z12.31 ENCOUNTER FOR SCREENING MAMMOGRAM FOR MALIGNANT NEOPLASM OF BREAST: ICD-10-CM

## 2025-03-31 DIAGNOSIS — Z78.0 ASYMPTOMATIC MENOPAUSAL STATE: ICD-10-CM

## 2025-03-31 PROCEDURE — 77063 BREAST TOMOSYNTHESIS BI: CPT

## 2025-03-31 PROCEDURE — 77080 DXA BONE DENSITY AXIAL: CPT

## 2025-04-09 ENCOUNTER — TELEPHONE (OUTPATIENT)
Age: 70
End: 2025-04-09

## 2025-07-14 ENCOUNTER — OFFICE VISIT (OUTPATIENT)
Age: 70
End: 2025-07-14
Payer: MEDICARE

## 2025-07-14 VITALS — WEIGHT: 142 LBS | HEIGHT: 63 IN | BODY MASS INDEX: 25.16 KG/M2

## 2025-07-14 DIAGNOSIS — M25.562 LEFT KNEE PAIN, UNSPECIFIED CHRONICITY: Primary | ICD-10-CM

## 2025-07-14 DIAGNOSIS — Z96.651 PAIN DUE TO TOTAL RIGHT KNEE REPLACEMENT, SUBSEQUENT ENCOUNTER: ICD-10-CM

## 2025-07-14 DIAGNOSIS — T84.84XD PAIN DUE TO TOTAL RIGHT KNEE REPLACEMENT, SUBSEQUENT ENCOUNTER: ICD-10-CM

## 2025-07-14 DIAGNOSIS — M17.12 PRIMARY OSTEOARTHRITIS OF LEFT KNEE: ICD-10-CM

## 2025-07-14 PROCEDURE — 20610 DRAIN/INJ JOINT/BURSA W/O US: CPT | Performed by: ORTHOPAEDIC SURGERY

## 2025-07-14 PROCEDURE — G8427 DOCREV CUR MEDS BY ELIG CLIN: HCPCS | Performed by: ORTHOPAEDIC SURGERY

## 2025-07-14 PROCEDURE — 3017F COLORECTAL CA SCREEN DOC REV: CPT | Performed by: ORTHOPAEDIC SURGERY

## 2025-07-14 PROCEDURE — 1036F TOBACCO NON-USER: CPT | Performed by: ORTHOPAEDIC SURGERY

## 2025-07-14 PROCEDURE — 99213 OFFICE O/P EST LOW 20 MIN: CPT | Performed by: ORTHOPAEDIC SURGERY

## 2025-07-14 PROCEDURE — 1123F ACP DISCUSS/DSCN MKR DOCD: CPT | Performed by: ORTHOPAEDIC SURGERY

## 2025-07-14 PROCEDURE — G8399 PT W/DXA RESULTS DOCUMENT: HCPCS | Performed by: ORTHOPAEDIC SURGERY

## 2025-07-14 PROCEDURE — 1159F MED LIST DOCD IN RCRD: CPT | Performed by: ORTHOPAEDIC SURGERY

## 2025-07-14 PROCEDURE — G8419 CALC BMI OUT NRM PARAM NOF/U: HCPCS | Performed by: ORTHOPAEDIC SURGERY

## 2025-07-14 PROCEDURE — 1090F PRES/ABSN URINE INCON ASSESS: CPT | Performed by: ORTHOPAEDIC SURGERY

## 2025-07-14 RX ORDER — LIDOCAINE HYDROCHLORIDE 10 MG/ML
6 INJECTION, SOLUTION INFILTRATION; PERINEURAL ONCE
Status: COMPLETED | OUTPATIENT
Start: 2025-07-14 | End: 2025-07-14

## 2025-07-14 RX ADMIN — LIDOCAINE HYDROCHLORIDE 6 ML: 10 INJECTION, SOLUTION INFILTRATION; PERINEURAL at 10:21

## 2025-07-14 NOTE — PROGRESS NOTES
[facility]  [facility]  [address]  [facility] [id number]  [phone]         Catrina Cheung (: 1955) is a 70 y.o. female, patient, here for evaluation of the following chief complaint(s): Knee Pain (Left )  .         Patient's PCP: Alejandra Pop DO     Patient's Last Appointment in this Department was on 2025      Subjective:     Chief Complaint   Patient presents with    Knee Pain     Left         History of Present Illness  The patient presents for a Gel-One injection.    She is scheduled for surgery in the winter and has been advised to discontinue Coumadin a month or two prior to the procedure. Today, she underwent x-rays. She reports an unusual incident that occurred about a month ago. While sitting in a chair, her knee locked, preventing her from standing up. It took several attempts to stand before her knee clicked back into place. This was a new experience for her and it caused her some distress. She describes the sensation as a click rather than a loud clunk. There was no visible deformity or swelling in her knee following the incident. The episode lasted less than 5 minutes and was accompanied by pain. She has not experienced similar issues since then. She is able to bend and straighten her knee, although it occasionally feels sore. She has found relief from injections, which have been effective for almost the entire duration. The first injection was administered on the outside of her knee, while the second was given on the inside. Initially, she experienced a tight feeling for the first couple of months, which made bending difficult. However, over the past month, she has noticed an improvement and can now walk without difficulty. She continues to work out in the yard and mow her lawn. She uses a pull-on elastic knee brace when her knee is particularly painful, which seems to provide some